# Patient Record
Sex: MALE | Race: BLACK OR AFRICAN AMERICAN | URBAN - METROPOLITAN AREA
[De-identification: names, ages, dates, MRNs, and addresses within clinical notes are randomized per-mention and may not be internally consistent; named-entity substitution may affect disease eponyms.]

---

## 2017-03-28 ENCOUNTER — HOSPITAL ENCOUNTER (EMERGENCY)
Facility: HOSPITAL | Age: 47
Discharge: HOME | End: 2017-03-28
Payer: COMMERCIAL

## 2017-03-28 ENCOUNTER — APPOINTMENT (OUTPATIENT)
Dept: CT IMAGING | Facility: HOSPITAL | Age: 47
End: 2017-03-28
Attending: NURSE PRACTITIONER
Payer: COMMERCIAL

## 2017-03-28 VITALS
DIASTOLIC BLOOD PRESSURE: 78 MMHG | RESPIRATION RATE: 18 BRPM | SYSTOLIC BLOOD PRESSURE: 126 MMHG | HEART RATE: 68 BPM | OXYGEN SATURATION: 99 % | WEIGHT: 186 LBS | TEMPERATURE: 99 F

## 2017-03-28 DIAGNOSIS — K52.9 COLITIS: ICD-10-CM

## 2017-03-28 DIAGNOSIS — R10.32 ABDOMINAL PAIN, LEFT LOWER QUADRANT: Primary | ICD-10-CM

## 2017-03-28 DIAGNOSIS — K57.30 DIVERTICULOSIS OF LARGE INTESTINE WITHOUT HEMORRHAGE: ICD-10-CM

## 2017-03-28 LAB
ANION GAP SERPL CALC-SCNC: 8 MMOL/L (ref 0–18)
BASOPHILS # BLD: 0.1 K/UL (ref 0–0.2)
BASOPHILS NFR BLD: 1 %
BILIRUB UR QL: NEGATIVE
BUN SERPL-MCNC: 11 MG/DL (ref 8–20)
BUN/CREAT SERPL: 10.5 (ref 10–20)
CALCIUM SERPL-MCNC: 9.3 MG/DL (ref 8.5–10.5)
CHLORIDE SERPL-SCNC: 103 MMOL/L (ref 95–110)
CLARITY UR: CLEAR
CO2 SERPL-SCNC: 26 MMOL/L (ref 22–32)
COLOR UR: YELLOW
CREAT SERPL-MCNC: 1.05 MG/DL (ref 0.5–1.5)
EOSINOPHIL # BLD: 0.1 K/UL (ref 0–0.7)
EOSINOPHIL NFR BLD: 1 %
ERYTHROCYTE [DISTWIDTH] IN BLOOD BY AUTOMATED COUNT: 14.5 % (ref 11–15)
GLUCOSE SERPL-MCNC: 247 MG/DL (ref 70–99)
GLUCOSE UR-MCNC: 250 MG/DL
HCT VFR BLD AUTO: 41.9 % (ref 41–52)
HGB BLD-MCNC: 14 G/DL (ref 13.5–17.5)
HGB UR QL STRIP.AUTO: NEGATIVE
KETONES UR-MCNC: NEGATIVE MG/DL
LEUKOCYTE ESTERASE UR QL STRIP.AUTO: NEGATIVE
LYMPHOCYTES # BLD: 2.9 K/UL (ref 1–4)
LYMPHOCYTES NFR BLD: 48 %
MCH RBC QN AUTO: 26.8 PG (ref 27–32)
MCHC RBC AUTO-ENTMCNC: 33.4 G/DL (ref 32–37)
MCV RBC AUTO: 80.4 FL (ref 80–100)
MONOCYTES # BLD: 0.3 K/UL (ref 0–1)
MONOCYTES NFR BLD: 4 %
NEUTROPHILS # BLD AUTO: 2.8 K/UL (ref 1.8–7.7)
NEUTROPHILS NFR BLD: 46 %
NITRITE UR QL STRIP.AUTO: NEGATIVE
OSMOLALITY UR CALC.SUM OF ELEC: 292 MOSM/KG (ref 275–295)
PH UR: 6 [PH] (ref 5–8)
PLATELET # BLD AUTO: 207 K/UL (ref 140–400)
PMV BLD AUTO: 9.4 FL (ref 7.4–10.3)
POTASSIUM SERPL-SCNC: 4 MMOL/L (ref 3.3–5.1)
PROT UR-MCNC: NEGATIVE MG/DL
RBC # BLD AUTO: 5.21 M/UL (ref 4.5–5.9)
RBC #/AREA URNS AUTO: 1 /HPF
SODIUM SERPL-SCNC: 137 MMOL/L (ref 136–144)
SP GR UR STRIP: 1.02 (ref 1–1.03)
UROBILINOGEN UR STRIP-ACNC: <2
WBC # BLD AUTO: 6.2 K/UL (ref 4–11)
WBC #/AREA URNS AUTO: 1 /HPF

## 2017-03-28 PROCEDURE — 36415 COLL VENOUS BLD VENIPUNCTURE: CPT

## 2017-03-28 PROCEDURE — 85025 COMPLETE CBC W/AUTO DIFF WBC: CPT | Performed by: NURSE PRACTITIONER

## 2017-03-28 PROCEDURE — 99284 EMERGENCY DEPT VISIT MOD MDM: CPT

## 2017-03-28 PROCEDURE — 81001 URINALYSIS AUTO W/SCOPE: CPT

## 2017-03-28 PROCEDURE — 74177 CT ABD & PELVIS W/CONTRAST: CPT

## 2017-03-28 PROCEDURE — 80048 BASIC METABOLIC PNL TOTAL CA: CPT | Performed by: NURSE PRACTITIONER

## 2017-03-28 RX ORDER — AMLODIPINE BESYLATE 10 MG/1
10 TABLET ORAL DAILY
COMMUNITY
End: 2017-05-23

## 2017-03-28 RX ORDER — METRONIDAZOLE 500 MG/1
500 TABLET ORAL 3 TIMES DAILY
Qty: 30 TABLET | Refills: 0 | Status: SHIPPED | OUTPATIENT
Start: 2017-03-28 | End: 2017-03-28

## 2017-03-28 RX ORDER — TRAMADOL HYDROCHLORIDE 50 MG/1
TABLET ORAL EVERY 4 HOURS PRN
Qty: 20 TABLET | Refills: 0 | Status: SHIPPED | OUTPATIENT
Start: 2017-03-28 | End: 2017-03-28

## 2017-03-28 RX ORDER — OMEPRAZOLE 20 MG/1
20 CAPSULE, DELAYED RELEASE ORAL
COMMUNITY
End: 2017-05-23

## 2017-03-28 RX ORDER — ACETAMINOPHEN AND CODEINE PHOSPHATE 300; 30 MG/1; MG/1
1 TABLET ORAL EVERY 6 HOURS PRN
COMMUNITY
End: 2017-05-23

## 2017-03-28 RX ORDER — METRONIDAZOLE 500 MG/1
500 TABLET ORAL 3 TIMES DAILY
Qty: 30 TABLET | Refills: 0 | Status: SHIPPED | OUTPATIENT
Start: 2017-03-28 | End: 2017-04-11

## 2017-03-28 RX ORDER — GLIMEPIRIDE 1 MG/1
1 TABLET ORAL 2 TIMES DAILY
COMMUNITY
End: 2017-05-19

## 2017-03-28 RX ORDER — LISINOPRIL 10 MG/1
10 TABLET ORAL DAILY
COMMUNITY
End: 2017-05-23

## 2017-03-28 RX ORDER — SIMVASTATIN 5 MG
10 TABLET ORAL NIGHTLY
COMMUNITY
End: 2017-05-19 | Stop reason: ALTCHOICE

## 2017-03-28 RX ORDER — TRAMADOL HYDROCHLORIDE 50 MG/1
TABLET ORAL EVERY 4 HOURS PRN
Qty: 20 TABLET | Refills: 0 | Status: SHIPPED | OUTPATIENT
Start: 2017-03-28 | End: 2017-04-04

## 2017-03-28 RX ORDER — LEVOFLOXACIN 500 MG/1
500 TABLET, FILM COATED ORAL DAILY
Qty: 10 TABLET | Refills: 0 | Status: SHIPPED | OUTPATIENT
Start: 2017-03-28 | End: 2017-04-11

## 2017-03-28 NOTE — ED PROVIDER NOTES
Patient Seen in: [facility] Emergency Department    History   Patient presents with:  Abdomen/Flank Pain (GI/)    Stated Complaint: c/o abd pain x 1 wk, denies N/V/D    HPI    49-year-old male, with a history of diabetes and hypertension, alda systems reviewed and negative except as noted above. PSFH elements reviewed from today and agreed except as otherwise stated in HPI.     Physical Exam       ED Triage Vitals   BP 03/28/17 1612 173/109 mmHg   Pulse 03/28/17 1612 72   Resp 03/28/17 1612 16 ------                     CBC W/ DIFFERENTIAL[[id number]]          Abnormal            Final result                 Please view results for these tests on the individual orders.    39 Cheri Bustillo GOLD   RAINBOW DRAW Car Krishnamurthy DR tablet Refills: 0    TraMADol HCl 50 MG Oral Tab  Take 1-2 tablets ( mg total) by mouth every 4 (four) hours as needed for Pain. Qty: 20 tablet Refills: 0    metRONIDAZOLE 500 MG Oral Tab  Take 1 tablet (500 mg total) by mouth 3 (three) times daily.

## 2017-03-28 NOTE — ED NOTES
Patient c/o of intermittent sharp LLQ pain for x1 week. He denies any N/V/D. He had a normal BM yesterday. Abdominal area is non tender to palpation. He Denies any GI/ changes or complaints. No dizziness, chest pain, SOB.  Bowel sounds active x4 quadrants

## 2017-03-28 NOTE — ED NOTES
Medications updated in chart. Patient states he has not been taking his prescribed medicine because he feels it causes him to have unfavorable side effects such as abdominal pain.  When asked about his BP- He informed me that he did not take his blood press

## 2017-03-29 ENCOUNTER — TELEPHONE (OUTPATIENT)
Dept: GASTROENTEROLOGY | Facility: CLINIC | Age: 47
End: 2017-03-29

## 2017-03-29 ENCOUNTER — OFFICE VISIT (OUTPATIENT)
Dept: INTERNAL MEDICINE CLINIC | Facility: CLINIC | Age: 47
End: 2017-03-29

## 2017-03-29 VITALS
HEART RATE: 96 BPM | HEIGHT: 67 IN | BODY MASS INDEX: 30.61 KG/M2 | DIASTOLIC BLOOD PRESSURE: 88 MMHG | RESPIRATION RATE: 18 BRPM | WEIGHT: 195 LBS | SYSTOLIC BLOOD PRESSURE: 142 MMHG | TEMPERATURE: 99 F

## 2017-03-29 DIAGNOSIS — R10.32 ABDOMINAL PAIN, LEFT LOWER QUADRANT: Primary | ICD-10-CM

## 2017-03-29 PROBLEM — E78.5 DYSLIPIDEMIA ASSOCIATED WITH TYPE 2 DIABETES MELLITUS (HCC): Status: ACTIVE | Noted: 2017-03-29

## 2017-03-29 PROBLEM — E11.69 DYSLIPIDEMIA ASSOCIATED WITH TYPE 2 DIABETES MELLITUS (HCC): Status: ACTIVE | Noted: 2017-03-29

## 2017-03-29 PROBLEM — E11.9 TYPE 2 DIABETES MELLITUS (HCC): Status: ACTIVE | Noted: 2017-03-29

## 2017-03-29 PROBLEM — I10 ESSENTIAL HYPERTENSION: Status: ACTIVE | Noted: 2017-03-29

## 2017-03-29 PROBLEM — E11.69 DYSLIPIDEMIA ASSOCIATED WITH TYPE 2 DIABETES MELLITUS: Status: ACTIVE | Noted: 2017-03-29

## 2017-03-29 PROBLEM — K57.90 DIVERTICULOSIS: Status: ACTIVE | Noted: 2017-03-29

## 2017-03-29 PROBLEM — E78.5 DYSLIPIDEMIA ASSOCIATED WITH TYPE 2 DIABETES MELLITUS: Status: ACTIVE | Noted: 2017-03-29

## 2017-03-29 PROCEDURE — 99203 OFFICE O/P NEW LOW 30 MIN: CPT | Performed by: INTERNAL MEDICINE

## 2017-03-29 PROCEDURE — 99212 OFFICE O/P EST SF 10 MIN: CPT | Performed by: INTERNAL MEDICINE

## 2017-03-29 RX ORDER — INFLUENZA VIRUS VACCINE 15; 15; 15; 15 UG/.5ML; UG/.5ML; UG/.5ML; UG/.5ML
SUSPENSION INTRAMUSCULAR
Refills: 0 | COMMUNITY
Start: 2017-02-26 | End: 2017-03-29 | Stop reason: ALTCHOICE

## 2017-03-29 RX ORDER — CHLORAL HYDRATE 500 MG
1000 CAPSULE ORAL DAILY
COMMUNITY
End: 2020-08-31 | Stop reason: ALTCHOICE

## 2017-03-29 NOTE — TELEPHONE ENCOUNTER
Dr. Ellen Staley - Pt accepted appt for tomorrow 3.30.17 at 3:00pm at [facility] [place]. Pt confirmed date/time/location. Added to schedule.

## 2017-03-29 NOTE — TELEPHONE ENCOUNTER
Is there availability for patient to be seen tomorrow afternoon at 3pm? It is not my usual clinic time.

## 2017-03-29 NOTE — PATIENT INSTRUCTIONS
Please continue your antibiotics. Schedule a visit with GI as planned. Resume your diabetes, high blood pressure and cholesterol medications. Obtain previous medical records. Return visit in 2-3 weeks.

## 2017-03-29 NOTE — ED NOTES
Discharge instructions reviewed with patient and family. Patient verbalizes understanding. Prescriptions provided as ordered with education. Patient denies any further questions/concerns. IV discontinued. Vitals stable.  Patient d/c from ED in good, stable

## 2017-03-29 NOTE — PROGRESS NOTES
Adri Barron is a 55year old male. Patient presents with:  ER F/U: PATIENT SEEN IN EM-ER YESTERDAY WITH C/O ABDOMINAL PAIN, DX DIVERTICULOSIS.    HPI:   Mr. Sheryl Laguna presents this morning for his initial visit for ER follow-up.     For the past 1 currently is only on amlodipine in addition to antibiotics and tramadol. No medication allergies. Current Outpatient Prescriptions:  omega-3 fatty acids 1000 MG Oral Cap Take 1,000 mg by mouth daily.  Disp:  Rfl:    AmLODIPine Besylate 10 MG Oral Tab Ta regular S1 S2 normal without murmur  ABDOMEN: Small reducible asymptomatic umbilical hernia. Not distended. Bowel sounds normal soft nontender without guarding or rebound and without mass or hepatosplenomegaly      ASSESSMENT AND PLAN:   1.  Abdominal gonzález

## 2017-03-29 NOTE — TELEPHONE ENCOUNTER
Dr. Jacque Ovalles (office on-call for Dr. Candance Berkeley) - Per Dr. Rigo Pineda, the on-call days have not been switched yet. Pls see below. There are no current ER follow up openings. Pls see CT results:  CONCLUSION:       1.  No bowel obstruction or localized inflammatory proce

## 2017-03-29 NOTE — TELEPHONE ENCOUNTER
Patient was in ER for diverticulosis and he was advised to see Dr Sofía Pierre in 3 days for a follow up    advise

## 2017-03-30 ENCOUNTER — TELEPHONE (OUTPATIENT)
Dept: GASTROENTEROLOGY | Facility: CLINIC | Age: 47
End: 2017-03-30

## 2017-03-30 ENCOUNTER — OFFICE VISIT (OUTPATIENT)
Dept: GASTROENTEROLOGY | Facility: CLINIC | Age: 47
End: 2017-03-30

## 2017-03-30 VITALS
HEIGHT: 67.25 IN | DIASTOLIC BLOOD PRESSURE: 88 MMHG | HEART RATE: 83 BPM | SYSTOLIC BLOOD PRESSURE: 140 MMHG | WEIGHT: 195.81 LBS | BODY MASS INDEX: 30.38 KG/M2

## 2017-03-30 DIAGNOSIS — K57.30 DIVERTICULOSIS OF LARGE INTESTINE WITHOUT HEMORRHAGE: Primary | ICD-10-CM

## 2017-03-30 DIAGNOSIS — Z12.11 SCREEN FOR COLON CANCER: ICD-10-CM

## 2017-03-30 PROCEDURE — 99204 OFFICE O/P NEW MOD 45 MIN: CPT | Performed by: INTERNAL MEDICINE

## 2017-03-30 PROCEDURE — 99212 OFFICE O/P EST SF 10 MIN: CPT | Performed by: INTERNAL MEDICINE

## 2017-03-30 RX ORDER — POLYETHYLENE GLYCOL 3350, SODIUM CHLORIDE, SODIUM BICARBONATE, POTASSIUM CHLORIDE 420; 11.2; 5.72; 1.48 G/4L; G/4L; G/4L; G/4L
POWDER, FOR SOLUTION ORAL
Qty: 1 BOTTLE | Refills: 0 | Status: ON HOLD | OUTPATIENT
Start: 2017-03-30 | End: 2017-05-16

## 2017-03-30 NOTE — TELEPHONE ENCOUNTER
Scheduled for:  Colonoscopy [address]  Provider Name:DR. NICHOLSON  Date: 5/16/17   Location: [facility]   Sedation:  IV SEDATION  Time:  0845 AM [facility] 0745 AM  Prep: COLYTE SPLIT DOSE   Meds/Allergies Reconciled?:  NKDA  Diagnosis with codes:  COLON CANCER SCREENING Z

## 2017-03-30 NOTE — PATIENT INSTRUCTIONS
1. Schedule colonoscopy with IV sedation     2.  bowel prep from pharmacy    3. Continue all medications for procedure, EXCEPT   -HOLD glimeperide and metformin the day before and day of the colonoscopy    4.  Read all bowel prep instructions careful

## 2017-03-30 NOTE — H&P
[address] [facility] [address] Gastroenterology                                                                                                  Clinic History and Physical     Pa Outpatient Prescriptions:  PEG 3350-KCl-Na Bicarb-NaCl (TRILYTE) 420 g Oral Recon Soln As directed. Disp: 1 Bottle Rfl: 0   levofloxacin 500 MG Oral Tab Take 1 tablet (500 mg total) by mouth daily.  Disp: 10 tablet Rfl: 0   metRONIDAZOLE 500 MG Oral Tab Damien Machuca comfortable and in no acute discomfort  HEENT: the sclera appears anicteric, oropharynx clear, mucus membranes appear moist  CV- regular rate and rhythm, the extremities are warm and well perfused   Lung- Moves air well;  No labored breathing  Abdomen- soft risks of anesthesia and perforation all leading to prolonged hospitalization, surgical intervention, or even death. I also specifically mentioned the miss rate of colonoscopy of 5-10% in the best of all circumstances.  All questions were answered to the pat

## 2017-04-07 ENCOUNTER — TELEPHONE (OUTPATIENT)
Dept: GASTROENTEROLOGY | Facility: CLINIC | Age: 47
End: 2017-04-07

## 2017-04-07 NOTE — TELEPHONE ENCOUNTER
Pt. States that his pain level is better, so he wants to know if he should continue to take his pain medication? He is not sure if he should get a refill?

## 2017-04-07 NOTE — TELEPHONE ENCOUNTER
Pt contacted and he states that upon d/c from the ED on 3/28/17 for LLQ pain he was prescribed metronidazole 500 mg 1 tab daily for 10 days, levofloxacin 500mg 1 tab 3 times daily for 10 days, and tramadol 50mg 1-2 tabs every 4-6 hours as needed b/c his CT

## 2017-04-07 NOTE — TELEPHONE ENCOUNTER
I would not recommend any more abx, he should check in next week to let us know if pain is not resolving.  He can take tyenol 1 pill 3-4x a day

## 2017-05-03 ENCOUNTER — HOSPITAL ENCOUNTER (EMERGENCY)
Facility: HOSPITAL | Age: 47
Discharge: HOME | End: 2017-05-03
Payer: COMMERCIAL

## 2017-05-03 ENCOUNTER — APPOINTMENT (OUTPATIENT)
Dept: GENERAL RADIOLOGY | Facility: HOSPITAL | Age: 47
End: 2017-05-03
Attending: PHYSICIAN ASSISTANT
Payer: COMMERCIAL

## 2017-05-03 VITALS
OXYGEN SATURATION: 99 % | TEMPERATURE: 99 F | SYSTOLIC BLOOD PRESSURE: 142 MMHG | BODY MASS INDEX: 30.53 KG/M2 | WEIGHT: 190 LBS | RESPIRATION RATE: 16 BRPM | HEIGHT: 66 IN | DIASTOLIC BLOOD PRESSURE: 88 MMHG | HEART RATE: 74 BPM

## 2017-05-03 DIAGNOSIS — R10.9 ABDOMINAL PAIN, ACUTE: Primary | ICD-10-CM

## 2017-05-03 DIAGNOSIS — K59.00 CONSTIPATION, UNSPECIFIED CONSTIPATION TYPE: ICD-10-CM

## 2017-05-03 PROCEDURE — 74000 XR ABDOMEN (1 VIEW) (CPT=74000): CPT

## 2017-05-03 PROCEDURE — 81003 URINALYSIS AUTO W/O SCOPE: CPT

## 2017-05-03 PROCEDURE — 36415 COLL VENOUS BLD VENIPUNCTURE: CPT

## 2017-05-03 PROCEDURE — 99283 EMERGENCY DEPT VISIT LOW MDM: CPT

## 2017-05-03 PROCEDURE — 80048 BASIC METABOLIC PNL TOTAL CA: CPT

## 2017-05-03 PROCEDURE — 85025 COMPLETE CBC W/AUTO DIFF WBC: CPT

## 2017-05-03 NOTE — ED PROVIDER NOTES
Patient Seen in: [facility] Emergency Department    History   No chief complaint on file. Stated Complaint: abdominal pain     The history is provided by the patient.    55 yom with pmhx of HTN, DM2, diverticulosis with onset of LUQ pain this mo pain and constipation. Negative for nausea, vomiting, diarrhea and blood in stool. Genitourinary: Negative. Negative for dysuria, urgency, hematuria, flank pain and testicular pain. Musculoskeletal: Negative. Skin: Negative.     Neurological: Terra Cook Negative   Blood Urine Negative Negative   Nitrite Urine Negative Negative   Urobilinogen Urine <2.0 <2.0   Leukocyte Esterase Urine Negative Negative   Ascorbic Acid Urine Negative Negative mg/dL   Microscopic Microscopic not indicated    -RAINBOW DRAW BL Absolute 0.2 0.0-1.0 K/UL   Eosinophil Absolute 0.1 0.0-0.7 K/UL   Basophil Absolute 0.0 0.0-0.2 K/UL       MDM     Labs WNL, XR showing constipation. Patient without fever or blood in stool. He has appt w/ GI 5/15.  Encouraged stool softeners, increase flu

## 2017-05-03 NOTE — ED INITIAL ASSESSMENT (HPI)
Left lower abd pain constant since this afternoon. Denies f/c, n/v/d. Pt dx diverticulitis 2 weeks ago, sent home with meds. States he feels it is more intense now.

## 2017-05-03 NOTE — ED NOTES
Discharge instructions reviewed with patient and family. Patient verbalizes understanding. Patient denies any further questions/concerns. Vitals stable. Patient d/c from ED in good, stable condition. Family to escort and assist patient home.

## 2017-05-06 ENCOUNTER — OFFICE VISIT (OUTPATIENT)
Dept: INTERNAL MEDICINE CLINIC | Facility: CLINIC | Age: 47
End: 2017-05-06

## 2017-05-06 VITALS
BODY MASS INDEX: 30.29 KG/M2 | HEART RATE: 84 BPM | WEIGHT: 193 LBS | SYSTOLIC BLOOD PRESSURE: 132 MMHG | TEMPERATURE: 98 F | HEIGHT: 67 IN | RESPIRATION RATE: 18 BRPM | DIASTOLIC BLOOD PRESSURE: 84 MMHG

## 2017-05-06 DIAGNOSIS — E11.69 DYSLIPIDEMIA ASSOCIATED WITH TYPE 2 DIABETES MELLITUS (HCC): ICD-10-CM

## 2017-05-06 DIAGNOSIS — I10 ESSENTIAL HYPERTENSION: ICD-10-CM

## 2017-05-06 DIAGNOSIS — E11.9 TYPE 2 DIABETES MELLITUS WITHOUT COMPLICATION, WITHOUT LONG-TERM CURRENT USE OF INSULIN (HCC): ICD-10-CM

## 2017-05-06 DIAGNOSIS — E78.5 DYSLIPIDEMIA ASSOCIATED WITH TYPE 2 DIABETES MELLITUS (HCC): ICD-10-CM

## 2017-05-06 DIAGNOSIS — R10.32 LEFT LOWER QUADRANT ABDOMINAL PAIN OF UNKNOWN ETIOLOGY: Primary | ICD-10-CM

## 2017-05-06 PROCEDURE — 99214 OFFICE O/P EST MOD 30 MIN: CPT | Performed by: INTERNAL MEDICINE

## 2017-05-06 PROCEDURE — 99212 OFFICE O/P EST SF 10 MIN: CPT | Performed by: INTERNAL MEDICINE

## 2017-05-06 RX ORDER — DICYCLOMINE HYDROCHLORIDE 10 MG/1
10 CAPSULE ORAL 3 TIMES DAILY PRN
Qty: 20 CAPSULE | Refills: 0 | Status: SHIPPED | OUTPATIENT
Start: 2017-05-06 | End: 2017-05-23

## 2017-05-06 NOTE — PATIENT INSTRUCTIONS
Await upcoming colonoscopy. Please try dicyclomine 10 mg 3 times daily as needed. Please continue your current medications. Please obtain previous medical records. Obtain blood tests soon when you can. Return visit in one month.

## 2017-05-06 NOTE — PROGRESS NOTES
Eric Garcia is a 55year old male. Patient presents with:  Constipation: PATIENT HERE WITH C/O ABDOMINAL DISCOMFORT AND CONSTIPATION. HPI:   Mr. Barrett Listen presents this morning for ER follow-up.     Since his initial visit with me in March, he lombardo mouth 2 (two) times daily. Disp:  Rfl:    lisinopril 10 MG Oral Tab Take 10 mg by mouth daily. Disp:  Rfl:    Acetaminophen-Codeine 300-30 MG Oral Tab Take 1 tablet by mouth every 6 (six) hours as needed for Pain.  Disp:  Rfl:    simvastatin 5 MG Oral Tab T he obtain previous medical records. Continue current medications for now. Check glycohemoglobin and lipid profile when able. Order sent. - Hemoglobin A1C; Future    3. Essential hypertension  Well-controlled    4.  Dyslipidemia associated with type 2 di

## 2017-05-09 ENCOUNTER — APPOINTMENT (OUTPATIENT)
Dept: LAB | Facility: HOSPITAL | Age: 47
End: 2017-05-09
Attending: INTERNAL MEDICINE
Payer: COMMERCIAL

## 2017-05-09 DIAGNOSIS — E11.9 TYPE 2 DIABETES MELLITUS WITHOUT COMPLICATION, WITHOUT LONG-TERM CURRENT USE OF INSULIN (HCC): ICD-10-CM

## 2017-05-09 DIAGNOSIS — E11.69 DYSLIPIDEMIA ASSOCIATED WITH TYPE 2 DIABETES MELLITUS (HCC): ICD-10-CM

## 2017-05-09 DIAGNOSIS — E78.5 DYSLIPIDEMIA ASSOCIATED WITH TYPE 2 DIABETES MELLITUS (HCC): ICD-10-CM

## 2017-05-09 PROCEDURE — 36415 COLL VENOUS BLD VENIPUNCTURE: CPT

## 2017-05-09 PROCEDURE — 83036 HEMOGLOBIN GLYCOSYLATED A1C: CPT

## 2017-05-09 PROCEDURE — 80061 LIPID PANEL: CPT

## 2017-05-15 ENCOUNTER — TELEPHONE (OUTPATIENT)
Dept: GASTROENTEROLOGY | Facility: CLINIC | Age: 47
End: 2017-05-15

## 2017-05-15 NOTE — TELEPHONE ENCOUNTER
Pt contacted and he states he has questions and concerns regarding the following and would like to speak with the physician:    1.  The risks and the benefits of the procedure, the complication rate of the MD.  2. Wants to know the sterilization process for

## 2017-05-15 NOTE — TELEPHONE ENCOUNTER
Spoke with Sugey Augustin and discussed with him my complication rate (no perforations, one elderly patient on blood thinner with transient bleeding). He is appreciative that I called him back. He will proceed with c-scope.

## 2017-05-16 ENCOUNTER — SURGERY (OUTPATIENT)
Age: 47
End: 2017-05-16

## 2017-05-16 ENCOUNTER — HOSPITAL ENCOUNTER (OUTPATIENT)
Facility: HOSPITAL | Age: 47
Setting detail: HOSPITAL OUTPATIENT SURGERY
Discharge: HOME | End: 2017-05-16
Attending: INTERNAL MEDICINE | Admitting: INTERNAL MEDICINE
Payer: COMMERCIAL

## 2017-05-16 PROCEDURE — 99153 MOD SED SAME PHYS/QHP EA: CPT | Performed by: INTERNAL MEDICINE

## 2017-05-16 PROCEDURE — 45385 COLONOSCOPY W/LESION REMOVAL: CPT | Performed by: INTERNAL MEDICINE

## 2017-05-16 PROCEDURE — 99152 MOD SED SAME PHYS/QHP 5/>YRS: CPT | Performed by: INTERNAL MEDICINE

## 2017-05-16 PROCEDURE — 0DBK8ZX EXCISION OF ASCENDING COLON, VIA NATURAL OR ARTIFICIAL OPENING ENDOSCOPIC, DIAGNOSTIC: ICD-10-PCS | Performed by: INTERNAL MEDICINE

## 2017-05-16 PROCEDURE — 0DBM8ZX EXCISION OF DESCENDING COLON, VIA NATURAL OR ARTIFICIAL OPENING ENDOSCOPIC, DIAGNOSTIC: ICD-10-PCS | Performed by: INTERNAL MEDICINE

## 2017-05-16 RX ORDER — SODIUM CHLORIDE 0.9 % (FLUSH) 0.9 %
10 SYRINGE (ML) INJECTION AS NEEDED
Status: DISCONTINUED | OUTPATIENT
Start: 2017-05-16 | End: 2017-05-16

## 2017-05-16 RX ORDER — MIDAZOLAM HYDROCHLORIDE 1 MG/ML
INJECTION INTRAMUSCULAR; INTRAVENOUS
Status: DISCONTINUED | OUTPATIENT
Start: 2017-05-16 | End: 2017-05-16

## 2017-05-16 RX ORDER — SODIUM CHLORIDE, SODIUM LACTATE, POTASSIUM CHLORIDE, CALCIUM CHLORIDE 600; 310; 30; 20 MG/100ML; MG/100ML; MG/100ML; MG/100ML
INJECTION, SOLUTION INTRAVENOUS CONTINUOUS
Status: DISCONTINUED | OUTPATIENT
Start: 2017-05-16 | End: 2017-05-16

## 2017-05-16 RX ORDER — MIDAZOLAM HYDROCHLORIDE 1 MG/ML
1 INJECTION INTRAMUSCULAR; INTRAVENOUS EVERY 5 MIN PRN
Status: DISCONTINUED | OUTPATIENT
Start: 2017-05-16 | End: 2017-05-16

## 2017-05-16 NOTE — OPERATIVE REPORT
COLONOSCOPY REPORT    Racquel Hanley     1970 Age 55year old   PCP Bernadette Garcia MD Endoscopist Bridger Barrera MD     Date of procedure: 2017    Procedure: Colonoscopy w/cold snare polypectomy    Pre-operative diagnosis: Marcus Han ascending colon; sessile morphology; cold snare polypectomy and retrieved. C. 6 mm polyp in the descending colon; polypoid morphology; cold snare polypectomy and retrieved.       D. 4 mm polyp in the descending colon; polypoid morphology; cold snare po

## 2017-05-16 NOTE — H&P
History & Physical Examination    Patient Name: Elijah Negro  MRN: [id number]  CSN: [id number]  YOB: 1970    Diagnosis: screening for colon cancer      Prescriptions prior to admission:   AmLODIPine Besylate 10 MG Oral Tab Take 10 mg by not normal, please explain:   ROBERTA [ Desiree Cogan  [ Luzma Tee [ So Catherine [ Lorilee Mcburney [ Evert Pompa [ Philipadene Settle      I have discussed the risks and benefits and alternatives of the procedure with the patient/family.   They understand and agree to pro

## 2017-05-17 VITALS
HEART RATE: 68 BPM | OXYGEN SATURATION: 98 % | SYSTOLIC BLOOD PRESSURE: 126 MMHG | BODY MASS INDEX: 28.79 KG/M2 | DIASTOLIC BLOOD PRESSURE: 83 MMHG | WEIGHT: 190 LBS | RESPIRATION RATE: 15 BRPM | HEIGHT: 68 IN

## 2017-05-17 PROBLEM — K63.5 COLON POLYPS: Status: ACTIVE | Noted: 2017-05-17

## 2017-05-18 ENCOUNTER — TELEPHONE (OUTPATIENT)
Dept: GASTROENTEROLOGY | Facility: CLINIC | Age: 47
End: 2017-05-18

## 2017-05-18 DIAGNOSIS — K76.9 LIVER LESION: Primary | ICD-10-CM

## 2017-05-18 NOTE — TELEPHONE ENCOUNTER
Pt contacted and I provided him the number to central scheduling to schedule his MRI of the liver. He is aware that managed care will call him once it is OK for him to schedule.    I also reviewed Dr. Zaina Ruiz message regarding his CLN results from TE 5/18/1

## 2017-05-18 NOTE — TELEPHONE ENCOUNTER
GI Clinical Staff:   Please call patient and set up non urgent MRI liver for f/u of liver lesion seen on CT. I also sent a letter for his colonoscopy results; all benign polyps, but tubular adenomas (pre-cancerous).  He will need repeat in 3 years (CLN)

## 2017-05-18 NOTE — TELEPHONE ENCOUNTER
GI staff: please place recall for colonoscopy in 3 years     Recall colon in 3 years per.  Colon done  I mailed out colonoscopy results letter to pt    em

## 2017-05-18 NOTE — TELEPHONE ENCOUNTER
Pt retd RN call about scheduling testing. See 5/18/17 closed TE. Please call. OK to leave detailed message.

## 2017-05-19 ENCOUNTER — TELEPHONE (OUTPATIENT)
Dept: INTERNAL MEDICINE CLINIC | Facility: CLINIC | Age: 47
End: 2017-05-19

## 2017-05-19 RX ORDER — SIMVASTATIN 10 MG
10 TABLET ORAL NIGHTLY
Qty: 90 TABLET | Refills: 0 | Status: CANCELLED | OUTPATIENT
Start: 2017-05-19

## 2017-05-19 RX ORDER — ATORVASTATIN CALCIUM 20 MG/1
20 TABLET, FILM COATED ORAL NIGHTLY
Qty: 90 TABLET | Refills: 0 | Status: SHIPPED | OUTPATIENT
Start: 2017-05-19 | End: 2017-09-05

## 2017-05-19 NOTE — TELEPHONE ENCOUNTER
1.  pt called and wanted me to inform you that he stopped taking Glimepiride and had stomach pain has resolved. He no longer has any stomach pain.  Pt stated that he is not on any DM medication and wants to ask you if you can advised on another DM m

## 2017-05-19 NOTE — TELEPHONE ENCOUNTER
He has been intolerant of both metformin and glimepiride. There are multiple other medications available for treatment of diabetes, however all of them are unfortunately much more expensive. I would recommend an appointment to discuss treatment options.

## 2017-05-19 NOTE — TELEPHONE ENCOUNTER
Pt stts he stopped taking glimepiride due to stomach pain. Pt stts once he stopped medication his pain went away. Please advise.

## 2017-05-23 ENCOUNTER — OFFICE VISIT (OUTPATIENT)
Dept: INTERNAL MEDICINE CLINIC | Facility: CLINIC | Age: 47
End: 2017-05-23

## 2017-05-23 VITALS
DIASTOLIC BLOOD PRESSURE: 78 MMHG | BODY MASS INDEX: 29.98 KG/M2 | HEIGHT: 67 IN | RESPIRATION RATE: 16 BRPM | HEART RATE: 73 BPM | SYSTOLIC BLOOD PRESSURE: 134 MMHG | WEIGHT: 191 LBS

## 2017-05-23 DIAGNOSIS — E78.5 DYSLIPIDEMIA ASSOCIATED WITH TYPE 2 DIABETES MELLITUS (HCC): ICD-10-CM

## 2017-05-23 DIAGNOSIS — E11.9 TYPE 2 DIABETES MELLITUS WITHOUT COMPLICATION, WITHOUT LONG-TERM CURRENT USE OF INSULIN (HCC): Primary | ICD-10-CM

## 2017-05-23 DIAGNOSIS — E11.69 DYSLIPIDEMIA ASSOCIATED WITH TYPE 2 DIABETES MELLITUS (HCC): ICD-10-CM

## 2017-05-23 DIAGNOSIS — I10 ESSENTIAL HYPERTENSION: ICD-10-CM

## 2017-05-23 PROCEDURE — 99213 OFFICE O/P EST LOW 20 MIN: CPT | Performed by: INTERNAL MEDICINE

## 2017-05-23 PROCEDURE — 99212 OFFICE O/P EST SF 10 MIN: CPT | Performed by: INTERNAL MEDICINE

## 2017-05-23 RX ORDER — AMLODIPINE BESYLATE 10 MG/1
10 TABLET ORAL DAILY
Qty: 30 TABLET | Refills: 5 | Status: SHIPPED | OUTPATIENT
Start: 2017-05-23 | End: 2017-12-16

## 2017-05-23 NOTE — PATIENT INSTRUCTIONS
Please begin Januvia 100 mg daily. Please continue your other medications. Return visit in 2 months.

## 2017-05-23 NOTE — PROGRESS NOTES
Latonya Ramirez is a 55year old male. Patient presents with:  Diabetes    HPI:   Mr. Eric Thomas presents this afternoon for follow-up. Recent visits for persistent left lower quadrant abdominal pain with essentially normal evaluation.   After his las repeat CLN in 5/2020   • Liver lesion       Social History:    Smoking Status: Never Smoker                      Alcohol Use: No                   EXAM:   GENERAL: Pleasant male appearing well in no distress  /78 mmHg  Pulse 73  Resp 16  Ht 5' 7\" (

## 2017-05-24 ENCOUNTER — TELEPHONE (OUTPATIENT)
Dept: FAMILY MEDICINE CLINIC | Facility: CLINIC | Age: 47
End: 2017-05-24

## 2017-05-24 NOTE — TELEPHONE ENCOUNTER
Pt is calling state that a PA is needed for medication SITagliptin Phosphate 100 MG Oral Tab  Pt state that he has been with medication for a couple of days need this work on ASAP pleae

## 2017-05-26 NOTE — TELEPHONE ENCOUNTER
PA denied. Plan states; unable to approve Januvia tablet 100 mg. The information submitted along with review of pharmacy claims history does not indicate criteria B outlined in plan guideline below has been met at this time. Plan Guideline HIM. PA. 58 (Lili Band

## 2017-05-26 NOTE — TELEPHONE ENCOUNTER
I am confused. He seems to meet all criteria that would enable him to get Januvia. He is older than 18, his glycohemoglobin level is greater than 7, and he is intolerant to metformin. He is also intolerant to glimepiride.   Could the prior authorization

## 2017-05-26 NOTE — TELEPHONE ENCOUNTER
Spoke with patient he is very upset and states he needs medication right away. He is okay with Metformin but needs something else in addition. Since insurance is denying can you prescribe something else. Please advise.

## 2017-05-26 NOTE — TELEPHONE ENCOUNTER
Pt calling to check status on PA- Pt states he spoke with insurance and insurance informed him it was denied because     Office did not provide enough information and that was the reason it was denied, Pt loud on the phone states this is his life involved

## 2017-05-30 NOTE — TELEPHONE ENCOUNTER
Januvia 100 mg tab approved #30/30 days for 12 months effective 5/27/2017; spoke with patient and [address] both notified of the approval. Medication has $0 co-pay.

## 2017-06-10 ENCOUNTER — HOSPITAL ENCOUNTER (OUTPATIENT)
Dept: MRI IMAGING | Facility: HOSPITAL | Age: 47
Discharge: HOME | End: 2017-06-10
Attending: INTERNAL MEDICINE
Payer: COMMERCIAL

## 2017-06-10 DIAGNOSIS — K76.9 LIVER LESION: ICD-10-CM

## 2017-06-10 PROCEDURE — 74183 MRI ABD W/O CNTR FLWD CNTR: CPT | Performed by: INTERNAL MEDICINE

## 2017-06-10 PROCEDURE — A9575 INJ GADOTERATE MEGLUMI 0.1ML: HCPCS | Performed by: INTERNAL MEDICINE

## 2017-06-10 PROCEDURE — 82565 ASSAY OF CREATININE: CPT

## 2017-06-12 ENCOUNTER — TELEPHONE (OUTPATIENT)
Dept: GASTROENTEROLOGY | Facility: CLINIC | Age: 47
End: 2017-06-12

## 2017-06-12 NOTE — TELEPHONE ENCOUNTER
D/w Jeremi Welch, MR liver shows simple liver cyst, no complex cyst. Benign, no further evaluation needed for liver cyst. He agrees w/plan.

## 2017-08-18 ENCOUNTER — OFFICE VISIT (OUTPATIENT)
Dept: INTERNAL MEDICINE CLINIC | Facility: CLINIC | Age: 47
End: 2017-08-18

## 2017-08-18 VITALS
WEIGHT: 195 LBS | HEIGHT: 67 IN | HEART RATE: 90 BPM | BODY MASS INDEX: 30.61 KG/M2 | DIASTOLIC BLOOD PRESSURE: 62 MMHG | SYSTOLIC BLOOD PRESSURE: 108 MMHG

## 2017-08-18 DIAGNOSIS — E78.5 DYSLIPIDEMIA ASSOCIATED WITH TYPE 2 DIABETES MELLITUS (HCC): ICD-10-CM

## 2017-08-18 DIAGNOSIS — E11.9 TYPE 2 DIABETES MELLITUS WITHOUT COMPLICATION, WITHOUT LONG-TERM CURRENT USE OF INSULIN (HCC): Primary | ICD-10-CM

## 2017-08-18 DIAGNOSIS — E11.69 DYSLIPIDEMIA ASSOCIATED WITH TYPE 2 DIABETES MELLITUS (HCC): ICD-10-CM

## 2017-08-18 DIAGNOSIS — I10 ESSENTIAL HYPERTENSION: ICD-10-CM

## 2017-08-18 PROBLEM — Z12.11 SCREEN FOR COLON CANCER: Status: RESOLVED | Noted: 2017-03-30 | Resolved: 2017-08-18

## 2017-08-18 PROCEDURE — 99214 OFFICE O/P EST MOD 30 MIN: CPT | Performed by: INTERNAL MEDICINE

## 2017-08-18 PROCEDURE — 99212 OFFICE O/P EST SF 10 MIN: CPT | Performed by: INTERNAL MEDICINE

## 2017-08-18 NOTE — PATIENT INSTRUCTIONS
Please obtain blood and urine testing soon. Please continue your current medications for now. Return visit in 6 months.

## 2017-08-18 NOTE — H&P
Melisa Rust is a 55year old male who presents this morning for follow-up of type 2 diabetes, hypertension and dyslipidemia. HPI:   He feels well today. He is tolerating Januvia without any side effects.   He stopped atorvastatin because it raised fever  LUNGS: No cough wheezing or shortness of breath  CARDIAC: No lightheadedness palpitations or chest pain  GI: No anorexia heartburn dysphagia nausea vomiting abdominal pain diarrhea constipation or rectal bleeding  : No urinary frequency dysuria or mellitus ([facility]  Await labs      Sb Brown MD  8/18/2017  10:30 AM

## 2017-08-23 ENCOUNTER — APPOINTMENT (OUTPATIENT)
Dept: LAB | Facility: HOSPITAL | Age: 47
End: 2017-08-23
Attending: INTERNAL MEDICINE
Payer: COMMERCIAL

## 2017-08-23 DIAGNOSIS — E11.9 TYPE 2 DIABETES MELLITUS WITHOUT COMPLICATION, WITHOUT LONG-TERM CURRENT USE OF INSULIN (HCC): ICD-10-CM

## 2017-08-23 LAB
ALBUMIN SERPL BCP-MCNC: 4.3 G/DL (ref 3.5–4.8)
ALBUMIN/GLOB SERPL: 1.4 {RATIO} (ref 1–2)
ALP SERPL-CCNC: 80 U/L (ref 32–100)
ALT SERPL-CCNC: 27 U/L (ref 17–63)
ANION GAP SERPL CALC-SCNC: 6 MMOL/L (ref 0–18)
AST SERPL-CCNC: 21 U/L (ref 15–41)
BILIRUB SERPL-MCNC: 0.5 MG/DL (ref 0.3–1.2)
BUN SERPL-MCNC: 11 MG/DL (ref 8–20)
BUN/CREAT SERPL: 10.4 (ref 10–20)
CALCIUM SERPL-MCNC: 9.6 MG/DL (ref 8.5–10.5)
CHLORIDE SERPL-SCNC: 106 MMOL/L (ref 95–110)
CHOLEST SERPL-MCNC: 235 MG/DL (ref 110–200)
CO2 SERPL-SCNC: 24 MMOL/L (ref 22–32)
CREAT SERPL-MCNC: 1.06 MG/DL (ref 0.5–1.5)
CREAT UR-MCNC: 54 MG/DL
ERYTHROCYTE [DISTWIDTH] IN BLOOD BY AUTOMATED COUNT: 15.2 % (ref 11–15)
GLOBULIN PLAS-MCNC: 3 G/DL (ref 2.5–3.7)
GLUCOSE SERPL-MCNC: 164 MG/DL (ref 70–99)
HBA1C MFR BLD: 8.3 % (ref 4–6)
HCT VFR BLD AUTO: 46.7 % (ref 41–52)
HDLC SERPL-MCNC: 33 MG/DL
HGB BLD-MCNC: 15.2 G/DL (ref 13.5–17.5)
LDLC SERPL CALC-MCNC: 155 MG/DL (ref 0–99)
MCH RBC QN AUTO: 26.1 PG (ref 27–32)
MCHC RBC AUTO-ENTMCNC: 32.6 G/DL (ref 32–37)
MCV RBC AUTO: 79.9 FL (ref 80–100)
MICROALBUMIN UR-MCNC: 0.5 MG/DL (ref 0–1.8)
MICROALBUMIN/CREAT UR: 9.3 MG/G{CREAT} (ref 0–20)
NONHDLC SERPL-MCNC: 202 MG/DL
OSMOLALITY UR CALC.SUM OF ELEC: 285 MOSM/KG (ref 275–295)
PLATELET # BLD AUTO: 241 K/UL (ref 140–400)
PMV BLD AUTO: 9.6 FL (ref 7.4–10.3)
POTASSIUM SERPL-SCNC: 3.9 MMOL/L (ref 3.3–5.1)
PROT SERPL-MCNC: 7.3 G/DL (ref 5.9–8.4)
RBC # BLD AUTO: 5.85 M/UL (ref 4.5–5.9)
SODIUM SERPL-SCNC: 136 MMOL/L (ref 136–144)
TRIGL SERPL-MCNC: 235 MG/DL (ref 1–149)
WBC # BLD AUTO: 6.9 K/UL (ref 4–11)

## 2017-08-23 PROCEDURE — 80053 COMPREHEN METABOLIC PANEL: CPT

## 2017-08-23 PROCEDURE — 85027 COMPLETE CBC AUTOMATED: CPT

## 2017-08-23 PROCEDURE — 82570 ASSAY OF URINE CREATININE: CPT

## 2017-08-23 PROCEDURE — 83036 HEMOGLOBIN GLYCOSYLATED A1C: CPT

## 2017-08-23 PROCEDURE — 36415 COLL VENOUS BLD VENIPUNCTURE: CPT

## 2017-08-23 PROCEDURE — 82043 UR ALBUMIN QUANTITATIVE: CPT

## 2017-08-23 PROCEDURE — 80061 LIPID PANEL: CPT

## 2017-09-05 RX ORDER — ATORVASTATIN CALCIUM 20 MG/1
TABLET, FILM COATED ORAL
Qty: 90 TABLET | Refills: 1 | Status: SHIPPED | OUTPATIENT
Start: 2017-09-05 | End: 2018-05-30

## 2017-12-04 RX ORDER — SITAGLIPTIN 100 MG/1
TABLET, FILM COATED ORAL
Qty: 30 TABLET | Refills: 0 | Status: SHIPPED | OUTPATIENT
Start: 2017-12-04 | End: 2017-12-16

## 2017-12-16 ENCOUNTER — APPOINTMENT (OUTPATIENT)
Dept: LAB | Facility: HOSPITAL | Age: 47
End: 2017-12-16
Attending: INTERNAL MEDICINE
Payer: COMMERCIAL

## 2017-12-16 ENCOUNTER — TELEPHONE (OUTPATIENT)
Dept: OTHER | Age: 47
End: 2017-12-16

## 2017-12-16 ENCOUNTER — TELEPHONE (OUTPATIENT)
Dept: INTERNAL MEDICINE CLINIC | Facility: CLINIC | Age: 47
End: 2017-12-16

## 2017-12-16 ENCOUNTER — OFFICE VISIT (OUTPATIENT)
Dept: INTERNAL MEDICINE CLINIC | Facility: CLINIC | Age: 47
End: 2017-12-16

## 2017-12-16 VITALS
BODY MASS INDEX: 30.92 KG/M2 | SYSTOLIC BLOOD PRESSURE: 124 MMHG | HEART RATE: 83 BPM | HEIGHT: 67 IN | DIASTOLIC BLOOD PRESSURE: 72 MMHG | WEIGHT: 197 LBS

## 2017-12-16 DIAGNOSIS — I10 ESSENTIAL HYPERTENSION: ICD-10-CM

## 2017-12-16 DIAGNOSIS — E11.9 TYPE 2 DIABETES MELLITUS WITHOUT COMPLICATION, WITHOUT LONG-TERM CURRENT USE OF INSULIN (HCC): Primary | ICD-10-CM

## 2017-12-16 DIAGNOSIS — E11.9 TYPE 2 DIABETES MELLITUS WITHOUT COMPLICATION, WITHOUT LONG-TERM CURRENT USE OF INSULIN (HCC): ICD-10-CM

## 2017-12-16 PROCEDURE — 36415 COLL VENOUS BLD VENIPUNCTURE: CPT

## 2017-12-16 PROCEDURE — 99213 OFFICE O/P EST LOW 20 MIN: CPT | Performed by: INTERNAL MEDICINE

## 2017-12-16 PROCEDURE — 83036 HEMOGLOBIN GLYCOSYLATED A1C: CPT

## 2017-12-16 PROCEDURE — 99212 OFFICE O/P EST SF 10 MIN: CPT | Performed by: INTERNAL MEDICINE

## 2017-12-16 RX ORDER — AMLODIPINE BESYLATE 10 MG/1
10 TABLET ORAL DAILY
Qty: 30 TABLET | Refills: 5 | Status: SHIPPED | OUTPATIENT
Start: 2017-12-16 | End: 2018-05-30

## 2017-12-16 NOTE — PATIENT INSTRUCTIONS
Await results of today's blood test.  Continue current medications. Please resume atorvastatin 20 mg 3 days weekly. Please let me know the name and dose of your diuretic pill. Try to eat healthy, exercise regularly and lose weight.   Return visit in 3 mo

## 2017-12-16 NOTE — PROGRESS NOTES
Agapito Zamudio is a 55year old male. Patient presents with:  Diabetes  Hypertension    HPI:   Mr. Rosendo Franco presents this morning for follow-up of type 2 diabetes, hypertension and dyslipidemia.   At his last visit in August, glycohemoglobin elevated Liver cyst 6/2017    simple liver cyst on MRI liver   • Type 2 diabetes mellitus [facility])      Past Surgical History:  5/16/2017: COLONOSCOPY N/A      Comment: Procedure: COLONOSCOPY;  Surgeon: Davonte Barry MD;  Location: [address] [place] [address] ENDOSCOPY   So

## 2017-12-16 NOTE — TELEPHONE ENCOUNTER
Patient stop back to give RX that he is on Lisinopril 10MG also patient would like a prostate check and sleep Andreas Cancer check, forgot to mention at appointment today.

## 2017-12-16 NOTE — TELEPHONE ENCOUNTER
Pt states that he had an OV today and his [facility] rx was refilled. States that when he went to  his rx, he was charged 500.   In May when pt started this med,  Through PA, he was able to get this med without any cost.  (even though according to May

## 2017-12-18 NOTE — TELEPHONE ENCOUNTER
Please add lisinopril as stated to medication list.  Okay to refill for 6 months if he needs refills. We can discuss prostate check and sleep apnea check at his next visit, unless he wishes to come in sooner.

## 2017-12-18 NOTE — TELEPHONE ENCOUNTER
Pt states he was able to get his Januvia refilled through his insurance provider. Also please see physician to call encounter. Pt has questions about having Lisinopril prescribed and having prostate checked and sleep apnea test ordered.

## 2017-12-19 RX ORDER — LISINOPRIL 10 MG/1
10 TABLET ORAL DAILY
Qty: 30 TABLET | Refills: 5 | Status: SHIPPED | OUTPATIENT
Start: 2017-12-19 | End: 2018-05-30

## 2017-12-19 NOTE — TELEPHONE ENCOUNTER
Contacted pt and informed him that I had updated his medication list to include lisinopril 10 mg once a day. Informed him of [facility] message regarding sleeping issue, and prostate check.   Pt requested an earlier appt instead of waiting for his next visit carin

## 2018-05-30 ENCOUNTER — OFFICE VISIT (OUTPATIENT)
Dept: INTERNAL MEDICINE CLINIC | Facility: CLINIC | Age: 48
End: 2018-05-30

## 2018-05-30 ENCOUNTER — APPOINTMENT (OUTPATIENT)
Dept: LAB | Facility: HOSPITAL | Age: 48
End: 2018-05-30
Attending: INTERNAL MEDICINE
Payer: COMMERCIAL

## 2018-05-30 VITALS
DIASTOLIC BLOOD PRESSURE: 66 MMHG | HEART RATE: 71 BPM | WEIGHT: 190 LBS | BODY MASS INDEX: 29.82 KG/M2 | HEIGHT: 67 IN | SYSTOLIC BLOOD PRESSURE: 122 MMHG

## 2018-05-30 DIAGNOSIS — E78.5 DYSLIPIDEMIA ASSOCIATED WITH TYPE 2 DIABETES MELLITUS (HCC): ICD-10-CM

## 2018-05-30 DIAGNOSIS — E11.9 TYPE 2 DIABETES MELLITUS WITHOUT COMPLICATION, WITHOUT LONG-TERM CURRENT USE OF INSULIN (HCC): Primary | ICD-10-CM

## 2018-05-30 DIAGNOSIS — I10 ESSENTIAL HYPERTENSION: ICD-10-CM

## 2018-05-30 DIAGNOSIS — E11.9 TYPE 2 DIABETES MELLITUS WITHOUT COMPLICATION, WITHOUT LONG-TERM CURRENT USE OF INSULIN (HCC): ICD-10-CM

## 2018-05-30 DIAGNOSIS — E11.69 DYSLIPIDEMIA ASSOCIATED WITH TYPE 2 DIABETES MELLITUS (HCC): ICD-10-CM

## 2018-05-30 PROCEDURE — 36415 COLL VENOUS BLD VENIPUNCTURE: CPT

## 2018-05-30 PROCEDURE — 99213 OFFICE O/P EST LOW 20 MIN: CPT | Performed by: INTERNAL MEDICINE

## 2018-05-30 PROCEDURE — 99212 OFFICE O/P EST SF 10 MIN: CPT | Performed by: INTERNAL MEDICINE

## 2018-05-30 PROCEDURE — 83036 HEMOGLOBIN GLYCOSYLATED A1C: CPT

## 2018-05-30 RX ORDER — LISINOPRIL 10 MG/1
10 TABLET ORAL DAILY
Qty: 90 TABLET | Refills: 1 | Status: SHIPPED | OUTPATIENT
Start: 2018-05-30 | End: 2018-08-29

## 2018-05-30 RX ORDER — AMLODIPINE BESYLATE 10 MG/1
10 TABLET ORAL DAILY
Qty: 90 TABLET | Refills: 1 | Status: SHIPPED | OUTPATIENT
Start: 2018-05-30 | End: 2018-08-29

## 2018-05-30 NOTE — PATIENT INSTRUCTIONS
Await results of glycohemoglobin. Continue current medication. Continue healthy diet and regular exercise. Please schedule a physical in  3 months.

## 2018-05-30 NOTE — PROGRESS NOTES
Diana Siegel is a 52year old male. Patient presents with:  Diabetes  Hypertension    HPI:   Sagrario Valentine presents this morning for follow-up of type 2 diabetes, hypertension and hypercholesterolemia.   Glycohemoglobin at his last visit December 2017 contr Comment: occassionally       EXAM:   GENERAL: Pleasant male appearing well in no distress  /66   Pulse 71   Ht 5' 7\" (1.702 m)   Wt 190 lb (86.2 kg)   BMI 29.76 kg/m²   LUNGS: Resonant to percussion and clear to auscultation  CARDIAC: Rhythm regular

## 2018-08-29 ENCOUNTER — APPOINTMENT (OUTPATIENT)
Dept: LAB | Facility: HOSPITAL | Age: 48
End: 2018-08-29
Attending: INTERNAL MEDICINE
Payer: COMMERCIAL

## 2018-08-29 ENCOUNTER — OFFICE VISIT (OUTPATIENT)
Dept: INTERNAL MEDICINE CLINIC | Facility: CLINIC | Age: 48
End: 2018-08-29
Payer: COMMERCIAL

## 2018-08-29 VITALS
HEART RATE: 62 BPM | BODY MASS INDEX: 29.51 KG/M2 | SYSTOLIC BLOOD PRESSURE: 132 MMHG | WEIGHT: 188 LBS | HEIGHT: 67 IN | DIASTOLIC BLOOD PRESSURE: 84 MMHG

## 2018-08-29 DIAGNOSIS — E11.69 DYSLIPIDEMIA ASSOCIATED WITH TYPE 2 DIABETES MELLITUS (HCC): ICD-10-CM

## 2018-08-29 DIAGNOSIS — E11.9 TYPE 2 DIABETES MELLITUS WITHOUT COMPLICATION, WITHOUT LONG-TERM CURRENT USE OF INSULIN (HCC): ICD-10-CM

## 2018-08-29 DIAGNOSIS — Z00.00 ANNUAL PHYSICAL EXAM: Primary | ICD-10-CM

## 2018-08-29 DIAGNOSIS — E78.5 DYSLIPIDEMIA ASSOCIATED WITH TYPE 2 DIABETES MELLITUS (HCC): ICD-10-CM

## 2018-08-29 DIAGNOSIS — Z00.00 ANNUAL PHYSICAL EXAM: ICD-10-CM

## 2018-08-29 DIAGNOSIS — I10 ESSENTIAL HYPERTENSION: ICD-10-CM

## 2018-08-29 DIAGNOSIS — D12.6 ADENOMATOUS POLYP OF COLON, UNSPECIFIED PART OF COLON: ICD-10-CM

## 2018-08-29 DIAGNOSIS — K57.30 DIVERTICULOSIS OF LARGE INTESTINE WITHOUT HEMORRHAGE: ICD-10-CM

## 2018-08-29 LAB
ALBUMIN SERPL BCP-MCNC: 4.6 G/DL (ref 3.5–4.8)
ALBUMIN/GLOB SERPL: 1.4 {RATIO} (ref 1–2)
ALP SERPL-CCNC: 75 U/L (ref 32–100)
ALT SERPL-CCNC: 26 U/L (ref 17–63)
ANION GAP SERPL CALC-SCNC: 11 MMOL/L (ref 0–18)
AST SERPL-CCNC: 24 U/L (ref 15–41)
BILIRUB SERPL-MCNC: 0.8 MG/DL (ref 0.3–1.2)
BUN SERPL-MCNC: 11 MG/DL (ref 8–20)
BUN/CREAT SERPL: 9.6 (ref 10–20)
CALCIUM SERPL-MCNC: 9.8 MG/DL (ref 8.5–10.5)
CHLORIDE SERPL-SCNC: 103 MMOL/L (ref 95–110)
CHOLEST SERPL-MCNC: 283 MG/DL (ref 110–200)
CO2 SERPL-SCNC: 24 MMOL/L (ref 22–32)
CREAT SERPL-MCNC: 1.14 MG/DL (ref 0.5–1.5)
CREAT UR-MCNC: 195.2 MG/DL
ERYTHROCYTE [DISTWIDTH] IN BLOOD BY AUTOMATED COUNT: 15.4 % (ref 11–15)
GLOBULIN PLAS-MCNC: 3.3 G/DL (ref 2.5–3.7)
GLUCOSE SERPL-MCNC: 154 MG/DL (ref 70–99)
HBA1C MFR BLD: 8.4 % (ref 4–6)
HBV SURFACE AB SER-ACNC: 15.66 MIU/ML (ref ?–10)
HBV SURFACE AG SERPL QL IA: NONREACTIVE
HBV SURFACE AG SERPL QL IA: REACTIVE
HCT VFR BLD AUTO: 48.4 % (ref 41–52)
HCV AB SERPL QL IA: NONREACTIVE
HDLC SERPL-MCNC: 35 MG/DL
HGB BLD-MCNC: 15.9 G/DL (ref 13.5–17.5)
HIV1+2 AB SERPL QL IA: NONREACTIVE
LDLC SERPL CALC-MCNC: 174 MG/DL (ref 0–99)
MCH RBC QN AUTO: 26.8 PG (ref 27–32)
MCHC RBC AUTO-ENTMCNC: 32.9 G/DL (ref 32–37)
MCV RBC AUTO: 81.4 FL (ref 80–100)
MICROALBUMIN UR-MCNC: 1 MG/DL (ref 0–1.8)
MICROALBUMIN/CREAT UR: 5.1 MG/G{CREAT} (ref 0–20)
NONHDLC SERPL-MCNC: 248 MG/DL
OSMOLALITY UR CALC.SUM OF ELEC: 288 MOSM/KG (ref 275–295)
PATIENT FASTING: YES
PLATELET # BLD AUTO: 257 K/UL (ref 140–400)
PMV BLD AUTO: 9.4 FL (ref 7.4–10.3)
POTASSIUM SERPL-SCNC: 4 MMOL/L (ref 3.3–5.1)
PROT SERPL-MCNC: 7.9 G/DL (ref 5.9–8.4)
PSA SERPL-MCNC: 0.4 NG/ML (ref 0–4)
RBC # BLD AUTO: 5.94 M/UL (ref 4.5–5.9)
SODIUM SERPL-SCNC: 138 MMOL/L (ref 136–144)
T PALLIDUM AB SER QL: NEGATIVE
TRIGL SERPL-MCNC: 368 MG/DL (ref 1–149)
WBC # BLD AUTO: 6.5 K/UL (ref 4–11)

## 2018-08-29 PROCEDURE — 80053 COMPREHEN METABOLIC PANEL: CPT

## 2018-08-29 PROCEDURE — 87389 HIV-1 AG W/HIV-1&-2 AB AG IA: CPT

## 2018-08-29 PROCEDURE — 86803 HEPATITIS C AB TEST: CPT

## 2018-08-29 PROCEDURE — 87491 CHLMYD TRACH DNA AMP PROBE: CPT

## 2018-08-29 PROCEDURE — 82570 ASSAY OF URINE CREATININE: CPT

## 2018-08-29 PROCEDURE — 82043 UR ALBUMIN QUANTITATIVE: CPT

## 2018-08-29 PROCEDURE — 87591 N.GONORRHOEAE DNA AMP PROB: CPT

## 2018-08-29 PROCEDURE — 86780 TREPONEMA PALLIDUM: CPT

## 2018-08-29 PROCEDURE — 80061 LIPID PANEL: CPT

## 2018-08-29 PROCEDURE — 99396 PREV VISIT EST AGE 40-64: CPT | Performed by: INTERNAL MEDICINE

## 2018-08-29 PROCEDURE — 83036 HEMOGLOBIN GLYCOSYLATED A1C: CPT

## 2018-08-29 PROCEDURE — 87340 HEPATITIS B SURFACE AG IA: CPT

## 2018-08-29 PROCEDURE — 36415 COLL VENOUS BLD VENIPUNCTURE: CPT

## 2018-08-29 PROCEDURE — 86706 HEP B SURFACE ANTIBODY: CPT

## 2018-08-29 PROCEDURE — 85027 COMPLETE CBC AUTOMATED: CPT

## 2018-08-29 RX ORDER — AMLODIPINE BESYLATE 10 MG/1
10 TABLET ORAL DAILY
Qty: 90 TABLET | Refills: 1 | Status: SHIPPED | OUTPATIENT
Start: 2018-08-29 | End: 2019-08-31

## 2018-08-29 RX ORDER — LISINOPRIL 10 MG/1
10 TABLET ORAL DAILY
Qty: 90 TABLET | Refills: 1 | Status: SHIPPED | OUTPATIENT
Start: 2018-08-29 | End: 2019-08-31

## 2018-08-29 NOTE — H&P
Stanford Lamb is a 52year old male who presents for a complete physical exam, as well as for follow-up of type 2 diabetes, hypertension and dyslipidemia. HPI:   He feels well today.   His wife is seeing a fertility specialist and is being evaluated f scan 3-17 and colonoscopy 5-17   • Dyslipidemia associated with type 2 diabetes mellitus ([address]    • Esophageal reflux    • Essential hypertension    • Hx of adenomatous colonic polyps 05/2017    repeat CLN in 5/2020   • Liver cyst 6/2017    simple liver cy carotid bruits, distal pulses 2+ bilateral dorsalis pedis and posterior tibial  NEURO: Sensory testing with the 10 g monofilament diabetic sensory exam instrument is normal in both feet  GENITAL: Testes normal without mass and without inguinal hernia  RECT hemorrhage  Asymptomatic      Evelina Leon MD  8/29/2018  10:02 AM

## 2018-08-29 NOTE — PATIENT INSTRUCTIONS
Await results of today's blood and urine testing. Continue current medications. Please schedule an appointment with Ophthalmology soon. Continue healthy diet and resume regular exercise. Get a flu shot soon please. Return visit in 6 months.

## 2018-08-30 LAB
C TRACH DNA SPEC QL NAA+PROBE: NEGATIVE
N GONORRHOEA DNA SPEC QL NAA+PROBE: NEGATIVE

## 2018-08-31 ENCOUNTER — TELEPHONE (OUTPATIENT)
Dept: FAMILY MEDICINE CLINIC | Facility: CLINIC | Age: 48
End: 2018-08-31

## 2018-08-31 ENCOUNTER — TELEPHONE (OUTPATIENT)
Dept: INTERNAL MEDICINE CLINIC | Facility: CLINIC | Age: 48
End: 2018-08-31

## 2018-08-31 ENCOUNTER — TELEPHONE (OUTPATIENT)
Dept: OTHER | Age: 48
End: 2018-08-31

## 2018-08-31 RX ORDER — ATORVASTATIN CALCIUM 40 MG/1
40 TABLET, FILM COATED ORAL NIGHTLY
Qty: 90 TABLET | Refills: 1 | Status: SHIPPED | OUTPATIENT
Start: 2018-08-31 | End: 2019-06-19

## 2018-08-31 NOTE — TELEPHONE ENCOUNTER
Spoke to pt, he states that he was previously taking Atorvastatin (per med record, pt was taking Atorvastatin 20mg with refills on 5/19/17 and 9/5/17.)  He states that he had to stop taking it because it increased his blood sugar.  He denies taking steroids

## 2018-08-31 NOTE — TELEPHONE ENCOUNTER
Recent labs reviewed. CMP normal except glucose 154. CBC normal.  Glycohemoglobin elevated at 8.4%. Cholesterol 283 triglycerides 368 HDL 35 . PSA normal at 0.4.   Urine microalbumin normal.  HIV, treponemal antibodies, hepatitis C virus antibody

## 2018-08-31 NOTE — TELEPHONE ENCOUNTER
He has not been on a cholesterol medication. Atorvastatin is in addition to lisinopril, which is a blood pressure medication.

## 2018-08-31 NOTE — TELEPHONE ENCOUNTER
Pt is calling want to know if Dr Graeme James change his Cholesterol  medicine or was the dosage change   Requesting to speak with a RN

## 2018-09-04 NOTE — TELEPHONE ENCOUNTER
Patient called asking if there are any coupons that he could use to decrease the price of the medication. Advised PA nurse will call pharmacy. Spoke with [facility] pharmacist to see if they have any coupons that are available.  Found one coupon and faxe

## 2018-09-05 NOTE — TELEPHONE ENCOUNTER
Instead of Jardiance, recommend Invokana 100 mg 1 pill daily. However we are out of generic options to treat his diabetes, and all medication may be expensive.

## 2018-09-05 NOTE — TELEPHONE ENCOUNTER
Pt requesting an alternative for Jardiance. Verbalized too expensive. Requesting to speak to provider. Also requesting last lab result be sent to Aris Hernandez (wife gyne) fax [phone]. Labs faxed has pt requested.

## 2018-09-05 NOTE — TELEPHONE ENCOUNTER
Patient called stating the cost of the medication is $360 and it is for his deductible for the entire year. Once it is paid patient would not have to pay anything. Patient would like to know if he can have a different medication that would be cheaper.  Blanca

## 2018-09-06 ENCOUNTER — TELEPHONE (OUTPATIENT)
Dept: OTHER | Age: 48
End: 2018-09-06

## 2018-09-06 ENCOUNTER — OFFICE VISIT (OUTPATIENT)
Dept: OPTOMETRY | Facility: CLINIC | Age: 48
End: 2018-09-06
Payer: COMMERCIAL

## 2018-09-06 DIAGNOSIS — E11.9 TYPE 2 DIABETES MELLITUS WITHOUT COMPLICATION, WITHOUT LONG-TERM CURRENT USE OF INSULIN (HCC): Primary | ICD-10-CM

## 2018-09-06 DIAGNOSIS — H52.4 MYOPIA WITH ASTIGMATISM AND PRESBYOPIA, BILATERAL: ICD-10-CM

## 2018-09-06 DIAGNOSIS — H52.203 MYOPIA WITH ASTIGMATISM AND PRESBYOPIA, BILATERAL: ICD-10-CM

## 2018-09-06 DIAGNOSIS — H52.13 MYOPIA WITH ASTIGMATISM AND PRESBYOPIA, BILATERAL: ICD-10-CM

## 2018-09-06 PROCEDURE — 92004 COMPRE OPH EXAM NEW PT 1/>: CPT | Performed by: OPTOMETRIST

## 2018-09-06 PROCEDURE — 92015 DETERMINE REFRACTIVE STATE: CPT | Performed by: OPTOMETRIST

## 2018-09-06 NOTE — TELEPHONE ENCOUNTER
He should take both medications. I have no solution for the cost of the medication.  We are out of generic, low cost options

## 2018-09-06 NOTE — TELEPHONE ENCOUNTER
Spoke with patient, advised Dr Goyal Serum note and verbalized understanding.       Note      He should take both Invokana and Januvia

## 2018-09-06 NOTE — PATIENT INSTRUCTIONS
Myopia with astigmatism and presbyopia, bilateral  New RX given if patient wants to replace his glasses.     Type 2 diabetes mellitus ([facility] [address]  I advised patient that there is no background diabetic retinopathy in either eye and that they should continue to ke

## 2018-09-06 NOTE — PROGRESS NOTES
Tylor Jordan is a 52year old male. HPI:     HPI     Diabetic Eye Exam   Diabetes characteristics include controlled with diet and taking oral medications. Duration of 5 years. Number of years on pills 5. Number of years on insulin 0.   Does PT Phosphate (JANUVIA) 100 MG Oral Tab TAKE 1 TABLET(100 MG) BY MOUTH DAILY Disp: 90 tablet Rfl: 1   omega-3 fatty acids 1000 MG Oral Cap Take 1,000 mg by mouth daily.  Disp:  Rfl:        Allergies:  No Known Allergies    ROS:     ROS     Negative for: Constit -0.75 -1.25 090            Manifest Refraction #2       Sphere Cylinder Axis Dist VA Add    Right -0.75 -0.75 085 20/20 +1.50    Left -0.75 -1.00 090 20/20 +1.50          Final Rx       Sphere Cylinder Axis Add    Right -0.75 -0.75 085 +1.50    Left -0.75

## 2018-09-06 NOTE — TELEPHONE ENCOUNTER
PA approved effective 9/6/2018 for 365 days; [facility] pharmacy notified of the approval. Cost is $360 for 3 months supply. Spoke with patient informed of the deductible that he has to pay.  Patient would like to know if he should be taking the Januvia jonn

## 2018-09-06 NOTE — TELEPHONE ENCOUNTER
Patient states new rx requires a PA. PA for Invokana 100 mg tab completed with EPS via CMM response time 24-72 hours KEY [id number].

## 2018-09-06 NOTE — TELEPHONE ENCOUNTER
Spoke with patient, advised Dr Jana Baker note and verbalized understanding, requesting to send a note to the pharmacy about using any online coupon than can help him ,pharmacy verified.  Medication generated and note sent together regarding online coupon with

## 2019-01-05 RX ORDER — EMPAGLIFLOZIN 10 MG/1
TABLET, FILM COATED ORAL
Qty: 90 TABLET | Refills: 0 | OUTPATIENT
Start: 2019-01-05

## 2019-01-10 RX ORDER — EMPAGLIFLOZIN 10 MG/1
TABLET, FILM COATED ORAL
Qty: 90 TABLET | Refills: 0 | Status: SHIPPED | OUTPATIENT
Start: 2019-01-10 | End: 2019-01-12

## 2019-01-10 NOTE — TELEPHONE ENCOUNTER
Pt scheduled f/u appt on 1/12 and states out of meds below and req a refill until this appt.      •  SITagliptin Phosphate (JANUVIA) 100 MG Oral Tab, TAKE 1 TABLET(100 MG) BY MOUTH DAILY, Disp: 90 tablet, Rfl: 1

## 2019-01-12 ENCOUNTER — OFFICE VISIT (OUTPATIENT)
Dept: INTERNAL MEDICINE CLINIC | Facility: CLINIC | Age: 49
End: 2019-01-12
Payer: COMMERCIAL

## 2019-01-12 ENCOUNTER — MED REC SCAN ONLY (OUTPATIENT)
Dept: INTERNAL MEDICINE CLINIC | Facility: CLINIC | Age: 49
End: 2019-01-12

## 2019-01-12 VITALS
DIASTOLIC BLOOD PRESSURE: 72 MMHG | HEART RATE: 76 BPM | BODY MASS INDEX: 30.45 KG/M2 | SYSTOLIC BLOOD PRESSURE: 124 MMHG | HEIGHT: 67 IN | WEIGHT: 194 LBS

## 2019-01-12 DIAGNOSIS — E78.5 DYSLIPIDEMIA ASSOCIATED WITH TYPE 2 DIABETES MELLITUS (HCC): ICD-10-CM

## 2019-01-12 DIAGNOSIS — E11.9 TYPE 2 DIABETES MELLITUS WITHOUT COMPLICATION, WITHOUT LONG-TERM CURRENT USE OF INSULIN (HCC): Primary | ICD-10-CM

## 2019-01-12 DIAGNOSIS — E11.69 DYSLIPIDEMIA ASSOCIATED WITH TYPE 2 DIABETES MELLITUS (HCC): ICD-10-CM

## 2019-01-12 DIAGNOSIS — I10 ESSENTIAL HYPERTENSION: ICD-10-CM

## 2019-01-12 PROCEDURE — 99213 OFFICE O/P EST LOW 20 MIN: CPT | Performed by: INTERNAL MEDICINE

## 2019-01-12 PROCEDURE — 99212 OFFICE O/P EST SF 10 MIN: CPT | Performed by: INTERNAL MEDICINE

## 2019-01-12 PROCEDURE — 90686 IIV4 VACC NO PRSV 0.5 ML IM: CPT | Performed by: INTERNAL MEDICINE

## 2019-01-12 PROCEDURE — 90471 IMMUNIZATION ADMIN: CPT | Performed by: INTERNAL MEDICINE

## 2019-01-12 NOTE — PROGRESS NOTES
Moe Copeland is a 50year old male. Patient presents with:  Diabetes  Hypertension    HPI:   Patt Lesches presents this morning for follow-up of type 2 diabetes, hypertension and dyslipidemia.   At his physical last August, glycohemoglobin 8.4% and  COLONOSCOPY N/A 5/16/2017    Performed by Tk Samson MD at [address] [place] [address] ENDOSCOPY      Social History:  Social History    Tobacco Use      Smoking status: Never Smoker      Smokeless tobacco: Never Used    Alcohol use:  Yes      Alcohol/week: 0.6 oz      Types

## 2019-01-12 NOTE — PATIENT INSTRUCTIONS
You received a flu shot today. Please obtain blood tests soon when you can. Continue current medication. Please schedule a physical in 6 months.

## 2019-01-14 ENCOUNTER — APPOINTMENT (OUTPATIENT)
Dept: LAB | Facility: HOSPITAL | Age: 49
End: 2019-01-14
Attending: INTERNAL MEDICINE
Payer: COMMERCIAL

## 2019-01-14 DIAGNOSIS — E11.9 TYPE 2 DIABETES MELLITUS WITHOUT COMPLICATION, WITHOUT LONG-TERM CURRENT USE OF INSULIN (HCC): ICD-10-CM

## 2019-01-14 DIAGNOSIS — E78.5 DYSLIPIDEMIA ASSOCIATED WITH TYPE 2 DIABETES MELLITUS (HCC): ICD-10-CM

## 2019-01-14 DIAGNOSIS — E11.69 DYSLIPIDEMIA ASSOCIATED WITH TYPE 2 DIABETES MELLITUS (HCC): ICD-10-CM

## 2019-01-14 LAB
ALT SERPL-CCNC: 30 U/L (ref 17–63)
AST SERPL-CCNC: 21 U/L (ref 15–41)
CHOLEST SERPL-MCNC: 154 MG/DL (ref 110–200)
EST. AVERAGE GLUCOSE BLD GHB EST-MCNC: 151 MG/DL (ref 68–126)
HBA1C MFR BLD HPLC: 6.9 % (ref ?–5.7)
HDLC SERPL-MCNC: 31 MG/DL
LDLC SERPL CALC-MCNC: 88 MG/DL (ref 0–99)
NONHDLC SERPL-MCNC: 123 MG/DL
TRIGL SERPL-MCNC: 177 MG/DL (ref 1–149)

## 2019-01-14 PROCEDURE — 84460 ALANINE AMINO (ALT) (SGPT): CPT

## 2019-01-14 PROCEDURE — 84450 TRANSFERASE (AST) (SGOT): CPT

## 2019-01-14 PROCEDURE — 36415 COLL VENOUS BLD VENIPUNCTURE: CPT

## 2019-01-14 PROCEDURE — 83036 HEMOGLOBIN GLYCOSYLATED A1C: CPT

## 2019-01-14 PROCEDURE — 80061 LIPID PANEL: CPT

## 2019-02-12 ENCOUNTER — TELEPHONE (OUTPATIENT)
Dept: INTERNAL MEDICINE CLINIC | Facility: CLINIC | Age: 49
End: 2019-02-12

## 2019-02-12 NOTE — TELEPHONE ENCOUNTER
Contacted pt and informed him that our section on the form has been completed. I also faxed form and tax return form to number provided and rec'd confirmation.   Pt verbalized understanding

## 2019-02-12 NOTE — TELEPHONE ENCOUNTER
Patient dropped off form for financial assistance for [employer]. Please complete and fax to [phone]. Patient would like to  form when completed.

## 2019-03-08 ENCOUNTER — TELEPHONE (OUTPATIENT)
Dept: INTERNAL MEDICINE CLINIC | Facility: CLINIC | Age: 49
End: 2019-03-08

## 2019-03-08 DIAGNOSIS — E11.9 TYPE 2 DIABETES MELLITUS WITHOUT COMPLICATION, WITHOUT LONG-TERM CURRENT USE OF INSULIN (HCC): ICD-10-CM

## 2019-03-08 NOTE — TELEPHONE ENCOUNTER
[employer] called in stating that they received Pt's application for assistance, but they did not receive a script for medication below. She is requesting that be sent to the office. Please advise.      Fax# [phone]

## 2019-03-08 NOTE — TELEPHONE ENCOUNTER
Pended Jardiance for approval.  Once approved I will fax script over to [address] at [facility]

## 2019-03-29 ENCOUNTER — HOSPITAL ENCOUNTER (OUTPATIENT)
Dept: ULTRASOUND IMAGING | Age: 49
Discharge: HOME | End: 2019-03-29
Attending: INTERNAL MEDICINE

## 2019-03-29 DIAGNOSIS — Z13.6 SCREENING FOR CARDIOVASCULAR CONDITION: ICD-10-CM

## 2019-04-02 ENCOUNTER — HOSPITAL ENCOUNTER (OUTPATIENT)
Dept: CT IMAGING | Age: 49
Discharge: HOME | End: 2019-04-02
Attending: INTERNAL MEDICINE

## 2019-04-02 DIAGNOSIS — Z13.6 SCREENING FOR CARDIOVASCULAR CONDITION: ICD-10-CM

## 2019-04-02 NOTE — PROGRESS NOTES
Pt seen at [facility], [facility] for CTHS:  PRELIMINARY SCORE= 0.0  BP=  Cholestec labs as follows: Patient just had PV screening done last week, with cholesterol and BP.  TC=  HDL=  LDL=  TG=  GLUCOSE=  All results and risk factors discussed with patient; all qu

## 2019-04-04 ENCOUNTER — TELEPHONE (OUTPATIENT)
Dept: INTERNAL MEDICINE CLINIC | Facility: CLINIC | Age: 49
End: 2019-04-04

## 2019-04-04 NOTE — TELEPHONE ENCOUNTER
Advised patient of Dr. Millicent eaton. Patient verbalized understanding and had no further questions.

## 2019-04-04 NOTE — TELEPHONE ENCOUNTER
Please call patient with all the results from his free screenings done recently. He as an appointment with dr. Angel Luis Perkins for Monday 4/8/19 at 12:40pm  to discuss said results however he wants to be called by Dr. Angel Luis Perkins prior to his appointment. Please advise.

## 2019-04-04 NOTE — TELEPHONE ENCOUNTER
Pt states that he had a stroke and heart scan test done last week in [place]. Pt would like to know if the doctor received the results and would like a call back.

## 2019-04-13 ENCOUNTER — OFFICE VISIT (OUTPATIENT)
Dept: INTERNAL MEDICINE CLINIC | Facility: CLINIC | Age: 49
End: 2019-04-13
Payer: COMMERCIAL

## 2019-04-13 VITALS
HEART RATE: 80 BPM | SYSTOLIC BLOOD PRESSURE: 130 MMHG | RESPIRATION RATE: 16 BRPM | DIASTOLIC BLOOD PRESSURE: 82 MMHG | WEIGHT: 191 LBS | BODY MASS INDEX: 29.98 KG/M2 | HEIGHT: 67 IN

## 2019-04-13 DIAGNOSIS — M25.551 RIGHT HIP PAIN: Primary | ICD-10-CM

## 2019-04-13 PROCEDURE — 99213 OFFICE O/P EST LOW 20 MIN: CPT | Performed by: INTERNAL MEDICINE

## 2019-04-13 PROCEDURE — G0463 HOSPITAL OUTPT CLINIC VISIT: HCPCS | Performed by: INTERNAL MEDICINE

## 2019-04-13 NOTE — PROGRESS NOTES
Zoey Sloan is a 50year old male. Patient presents with:  Results    HPI:   Recently he decided to undergo ultrasound screening of his carotid arteries and abdominal aorta, as well as cardiac CT scan for calcium score.   He is here today to [facility] oz      Types: 1 Glasses of wine per week      Comment: Occasional    Drug use: No       EXAM:   GENERAL: Pleasant male appearing well in no distress  /82   Pulse 80   Resp 16   Ht 5' 7\" (1.702 m)   Wt 191 lb (86.6 kg)   BMI 29.91 kg/m²   EXTREMITIE

## 2019-04-13 NOTE — PATIENT INSTRUCTIONS
Please apply heat or ice to your right hip when needed, and take Advil when needed. Keep your appointment in July.

## 2019-05-15 ENCOUNTER — OFFICE VISIT (OUTPATIENT)
Dept: INTERNAL MEDICINE CLINIC | Facility: CLINIC | Age: 49
End: 2019-05-15
Payer: COMMERCIAL

## 2019-05-15 VITALS
HEART RATE: 71 BPM | DIASTOLIC BLOOD PRESSURE: 82 MMHG | HEIGHT: 67 IN | SYSTOLIC BLOOD PRESSURE: 132 MMHG | BODY MASS INDEX: 30.45 KG/M2 | TEMPERATURE: 98 F | WEIGHT: 194 LBS

## 2019-05-15 DIAGNOSIS — B00.1 HERPES LABIALIS: Primary | ICD-10-CM

## 2019-05-15 PROCEDURE — 99212 OFFICE O/P EST SF 10 MIN: CPT | Performed by: INTERNAL MEDICINE

## 2019-05-15 PROCEDURE — 99213 OFFICE O/P EST LOW 20 MIN: CPT | Performed by: INTERNAL MEDICINE

## 2019-05-15 NOTE — PROGRESS NOTES
Betzy Alejo is a 50year old male. Patient presents with:  Abscess (integumentary): Located on his bottom left side of the lip for 3 days.     HPI:   About 3 days ago, he awoke in the morning with fatigue and noticed a lesion on his lateral lef (36.9 °C) (Oral)   Ht 5' 7\" (1.702 m)   Wt 194 lb (88 kg)   BMI 30.38 kg/m²   HEENT: Small approximately 1 cm lesion lateral aspect left lower lip with visible tiny vesicles when viewed under magnification.   Oropharynx normal      ASSESSMENT AND PLAN:   1

## 2019-05-20 NOTE — TELEPHONE ENCOUNTER
Pls advise on message below Performed By: Pito Mohan Urine Pregnancy Test Result: negative Detail Level: None

## 2019-06-19 RX ORDER — ATORVASTATIN CALCIUM 40 MG/1
TABLET, FILM COATED ORAL
Qty: 90 TABLET | Refills: 1 | Status: SHIPPED | OUTPATIENT
Start: 2019-06-19 | End: 2019-08-31

## 2019-08-15 ENCOUNTER — TELEPHONE (OUTPATIENT)
Dept: INTERNAL MEDICINE CLINIC | Facility: CLINIC | Age: 49
End: 2019-08-15

## 2019-08-15 NOTE — TELEPHONE ENCOUNTER
Pt is due for a px after 8/29. Pt is only available on Saturdays. Narendra Manning, can we use a non px block on 8/31/2019 ? Please advise, thank you!        Please reply to pool: KACIE Burnett

## 2019-08-31 ENCOUNTER — APPOINTMENT (OUTPATIENT)
Dept: LAB | Facility: HOSPITAL | Age: 49
End: 2019-08-31
Attending: INTERNAL MEDICINE
Payer: COMMERCIAL

## 2019-08-31 ENCOUNTER — OFFICE VISIT (OUTPATIENT)
Dept: INTERNAL MEDICINE CLINIC | Facility: CLINIC | Age: 49
End: 2019-08-31
Payer: COMMERCIAL

## 2019-08-31 ENCOUNTER — TELEPHONE (OUTPATIENT)
Dept: INTERNAL MEDICINE CLINIC | Facility: CLINIC | Age: 49
End: 2019-08-31

## 2019-08-31 VITALS
DIASTOLIC BLOOD PRESSURE: 82 MMHG | BODY MASS INDEX: 30.03 KG/M2 | SYSTOLIC BLOOD PRESSURE: 132 MMHG | WEIGHT: 191.31 LBS | HEART RATE: 80 BPM | HEIGHT: 67 IN

## 2019-08-31 DIAGNOSIS — D12.6 ADENOMATOUS POLYP OF COLON, UNSPECIFIED PART OF COLON: ICD-10-CM

## 2019-08-31 DIAGNOSIS — E11.9 TYPE 2 DIABETES MELLITUS WITHOUT COMPLICATION, WITHOUT LONG-TERM CURRENT USE OF INSULIN (HCC): ICD-10-CM

## 2019-08-31 DIAGNOSIS — Z00.00 ANNUAL PHYSICAL EXAM: Primary | ICD-10-CM

## 2019-08-31 DIAGNOSIS — I10 ESSENTIAL HYPERTENSION: ICD-10-CM

## 2019-08-31 DIAGNOSIS — Z00.00 ANNUAL PHYSICAL EXAM: ICD-10-CM

## 2019-08-31 DIAGNOSIS — E11.69 DYSLIPIDEMIA ASSOCIATED WITH TYPE 2 DIABETES MELLITUS (HCC): ICD-10-CM

## 2019-08-31 DIAGNOSIS — E78.5 DYSLIPIDEMIA ASSOCIATED WITH TYPE 2 DIABETES MELLITUS (HCC): ICD-10-CM

## 2019-08-31 LAB
ALBUMIN SERPL-MCNC: 4.3 G/DL (ref 3.4–5)
ALBUMIN/GLOB SERPL: 1.2 {RATIO} (ref 1–2)
ALP LIVER SERPL-CCNC: 81 U/L (ref 45–117)
ALT SERPL-CCNC: 28 U/L (ref 16–61)
ANION GAP SERPL CALC-SCNC: 8 MMOL/L (ref 0–18)
AST SERPL-CCNC: 23 U/L (ref 15–37)
BILIRUB SERPL-MCNC: 0.5 MG/DL (ref 0.1–2)
BUN BLD-MCNC: 10 MG/DL (ref 7–18)
BUN/CREAT SERPL: 7.9 (ref 10–20)
CALCIUM BLD-MCNC: 9.4 MG/DL (ref 8.5–10.1)
CHLORIDE SERPL-SCNC: 106 MMOL/L (ref 98–112)
CHOLEST SMN-MCNC: 243 MG/DL (ref ?–200)
CO2 SERPL-SCNC: 24 MMOL/L (ref 21–32)
COMPLEXED PSA SERPL-MCNC: 0.51 NG/ML (ref ?–4)
CREAT BLD-MCNC: 1.27 MG/DL (ref 0.7–1.3)
CREAT UR-SCNC: 218 MG/DL
DEPRECATED RDW RBC AUTO: 44.2 FL (ref 35.1–46.3)
ERYTHROCYTE [DISTWIDTH] IN BLOOD BY AUTOMATED COUNT: 14.6 % (ref 11–15)
EST. AVERAGE GLUCOSE BLD GHB EST-MCNC: 151 MG/DL (ref 68–126)
GLOBULIN PLAS-MCNC: 3.7 G/DL (ref 2.8–4.4)
GLUCOSE BLD-MCNC: 109 MG/DL (ref 70–99)
HBA1C MFR BLD HPLC: 6.9 % (ref ?–5.7)
HCT VFR BLD AUTO: 49 % (ref 39–53)
HDLC SERPL-MCNC: 37 MG/DL (ref 40–59)
HGB BLD-MCNC: 16 G/DL (ref 13–17.5)
LDLC SERPL CALC-MCNC: 159 MG/DL (ref ?–100)
M PROTEIN MFR SERPL ELPH: 8 G/DL (ref 6.4–8.2)
MCH RBC QN AUTO: 27.2 PG (ref 26–34)
MCHC RBC AUTO-ENTMCNC: 32.7 G/DL (ref 31–37)
MCV RBC AUTO: 83.2 FL (ref 80–100)
MICROALBUMIN UR-MCNC: 2.78 MG/DL
MICROALBUMIN/CREAT 24H UR-RTO: 12.8 UG/MG (ref ?–30)
NONHDLC SERPL-MCNC: 206 MG/DL (ref ?–130)
OSMOLALITY SERPL CALC.SUM OF ELEC: 286 MOSM/KG (ref 275–295)
PATIENT FASTING: YES
PATIENT FASTING: YES
PLATELET # BLD AUTO: 263 10(3)UL (ref 150–450)
POTASSIUM SERPL-SCNC: 3.9 MMOL/L (ref 3.5–5.1)
RBC # BLD AUTO: 5.89 X10(6)UL (ref 4.3–5.7)
SODIUM SERPL-SCNC: 138 MMOL/L (ref 136–145)
TRIGL SERPL-MCNC: 233 MG/DL (ref 30–149)
VLDLC SERPL CALC-MCNC: 47 MG/DL (ref 0–30)
WBC # BLD AUTO: 5.4 X10(3) UL (ref 4–11)

## 2019-08-31 PROCEDURE — 85027 COMPLETE CBC AUTOMATED: CPT

## 2019-08-31 PROCEDURE — 36415 COLL VENOUS BLD VENIPUNCTURE: CPT

## 2019-08-31 PROCEDURE — 99396 PREV VISIT EST AGE 40-64: CPT | Performed by: INTERNAL MEDICINE

## 2019-08-31 PROCEDURE — 80053 COMPREHEN METABOLIC PANEL: CPT

## 2019-08-31 PROCEDURE — 82043 UR ALBUMIN QUANTITATIVE: CPT

## 2019-08-31 PROCEDURE — 83036 HEMOGLOBIN GLYCOSYLATED A1C: CPT

## 2019-08-31 PROCEDURE — 82570 ASSAY OF URINE CREATININE: CPT

## 2019-08-31 PROCEDURE — 80061 LIPID PANEL: CPT

## 2019-08-31 RX ORDER — LISINOPRIL 10 MG/1
10 TABLET ORAL DAILY
Qty: 90 TABLET | Refills: 1 | Status: SHIPPED | OUTPATIENT
Start: 2019-08-31 | End: 2020-09-01

## 2019-08-31 RX ORDER — AMLODIPINE BESYLATE 10 MG/1
10 TABLET ORAL DAILY
Qty: 90 TABLET | Refills: 1 | Status: SHIPPED | OUTPATIENT
Start: 2019-08-31 | End: 2020-09-01

## 2019-08-31 RX ORDER — ATORVASTATIN CALCIUM 40 MG/1
TABLET, FILM COATED ORAL
Qty: 90 TABLET | Refills: 1 | Status: SHIPPED | OUTPATIENT
Start: 2019-08-31 | End: 2021-08-01

## 2019-08-31 NOTE — PATIENT INSTRUCTIONS
Await results of today's blood and urine testing. Continue current medications. Continue healthy diet and regular exercise, maintaining your current weight. Please get a flu shot soon. Schedule an appointment with Ophthalmology soon.   Return visit in 6

## 2019-08-31 NOTE — TELEPHONE ENCOUNTER
Please call to initiate prior authorization process. He is intolerant of metformin and other diabetes medication. We had to go through the authorization process for Jardiance previously.

## 2019-08-31 NOTE — TELEPHONE ENCOUNTER
Current Outpatient Medications:  Empagliflozin (JARDIANCE) 10 MG Oral Tab Take 1 tablet by mouth once daily. Disp: 90 tablet Rfl: 1     Plan does not cover this medication.  Please call plan at [phone] to initiate PA or call/fax pharmacy to change me

## 2019-08-31 NOTE — H&P
Srinivas Ramos is a 50year old male who presents for a complete physical exam, as well as for follow-up of type 2 diabetes, hypertension and dyslipidemia. HPI:   His last physical was August 2018. He feels well.   No specific issues for discuss 3-17 and colonoscopy 5-17   • Dyslipidemia associated with type 2 diabetes mellitus ([facility] [address]    • Esophageal reflux    • Essential hypertension    • Hx of adenomatous colonic polyps 05/2017    repeat CLN in 5/2020   • Liver cyst 6/2017    simple liver cyst on bruits, distal pulses 2+ bilateral dorsalis pedis and posterior tibial  NEURO: Sensory testing with the 10 g monofilament diabetic sensory exam instrument is normal in both feet  GENITAL: Testes normal without mass and without inguinal hernia  RECTAL: Pros statin therapy. Await labs    5. Adenomatous polyp of colon, unspecified part of colon  Anticipate next colonoscopy 2020.       Judithe Ormond, MD  8/31/2019  10:47 AM

## 2020-01-23 ENCOUNTER — NURSE TRIAGE (OUTPATIENT)
Dept: INTERNAL MEDICINE CLINIC | Facility: CLINIC | Age: 50
End: 2020-01-23

## 2020-01-23 NOTE — TELEPHONE ENCOUNTER
Action Requested: Summary for Provider     []  Critical Lab, Recommendations Needed  [] Need Additional Advice  []   FYI    []   Need Orders  [] Need Medications Sent to Pharmacy  []  Other     SUMMARY: patient c/o intermittent pain in scrotum, 5/10 onset

## 2020-01-23 NOTE — TELEPHONE ENCOUNTER
Patient called in with symptoms below:    -Testicular pain when working out    CSS transferred to triage.

## 2020-01-24 ENCOUNTER — APPOINTMENT (OUTPATIENT)
Dept: LAB | Facility: HOSPITAL | Age: 50
End: 2020-01-24
Attending: INTERNAL MEDICINE
Payer: COMMERCIAL

## 2020-01-24 ENCOUNTER — OFFICE VISIT (OUTPATIENT)
Dept: INTERNAL MEDICINE CLINIC | Facility: CLINIC | Age: 50
End: 2020-01-24
Payer: COMMERCIAL

## 2020-01-24 ENCOUNTER — OFFICE VISIT (OUTPATIENT)
Dept: OPTOMETRY | Facility: CLINIC | Age: 50
End: 2020-01-24
Payer: COMMERCIAL

## 2020-01-24 ENCOUNTER — TELEPHONE (OUTPATIENT)
Dept: OTHER | Age: 50
End: 2020-01-24

## 2020-01-24 VITALS
WEIGHT: 193 LBS | HEART RATE: 75 BPM | TEMPERATURE: 99 F | BODY MASS INDEX: 29.25 KG/M2 | SYSTOLIC BLOOD PRESSURE: 136 MMHG | DIASTOLIC BLOOD PRESSURE: 74 MMHG | HEIGHT: 68 IN

## 2020-01-24 DIAGNOSIS — E11.9 TYPE 2 DIABETES MELLITUS WITHOUT COMPLICATION, WITHOUT LONG-TERM CURRENT USE OF INSULIN (HCC): ICD-10-CM

## 2020-01-24 DIAGNOSIS — H52.4 MYOPIA WITH ASTIGMATISM AND PRESBYOPIA, BILATERAL: ICD-10-CM

## 2020-01-24 DIAGNOSIS — H52.13 MYOPIA WITH ASTIGMATISM AND PRESBYOPIA, BILATERAL: ICD-10-CM

## 2020-01-24 DIAGNOSIS — N50.812 LEFT TESTICULAR PAIN: Primary | ICD-10-CM

## 2020-01-24 DIAGNOSIS — E11.9 TYPE 2 DIABETES MELLITUS WITHOUT COMPLICATION, WITHOUT LONG-TERM CURRENT USE OF INSULIN (HCC): Primary | ICD-10-CM

## 2020-01-24 DIAGNOSIS — H52.203 MYOPIA WITH ASTIGMATISM AND PRESBYOPIA, BILATERAL: ICD-10-CM

## 2020-01-24 DIAGNOSIS — I10 ESSENTIAL HYPERTENSION: ICD-10-CM

## 2020-01-24 LAB
EST. AVERAGE GLUCOSE BLD GHB EST-MCNC: 157 MG/DL (ref 68–126)
HBA1C MFR BLD HPLC: 7.1 % (ref ?–5.7)

## 2020-01-24 PROCEDURE — 92015 DETERMINE REFRACTIVE STATE: CPT | Performed by: OPTOMETRIST

## 2020-01-24 PROCEDURE — 92014 COMPRE OPH EXAM EST PT 1/>: CPT | Performed by: OPTOMETRIST

## 2020-01-24 PROCEDURE — 36415 COLL VENOUS BLD VENIPUNCTURE: CPT

## 2020-01-24 PROCEDURE — 99213 OFFICE O/P EST LOW 20 MIN: CPT | Performed by: INTERNAL MEDICINE

## 2020-01-24 PROCEDURE — 83036 HEMOGLOBIN GLYCOSYLATED A1C: CPT

## 2020-01-24 NOTE — PATIENT INSTRUCTIONS
Please avoid painful activity for now and call if your pain does not soon go away. Schedule a testicular ultrasound if you wish. Await results of glycohemoglobin level. Continue current medication. Physical with complete labs in 6 months.

## 2020-01-24 NOTE — PROGRESS NOTES
Porfirio Zimmerman is a 52year old male.  Patient presents with:  Pain: patient complains of pain in the left  testicle,worse when exercisingx 4 days  pain is a 5/10      HPI:   For the past 4 days, he has had persistent pain in his left testis that History:  Social History    Tobacco Use      Smoking status: Never Smoker      Smokeless tobacco: Never Used    Alcohol use:  Yes      Alcohol/week: 1.0 standard drinks      Types: 1 Glasses of wine per week      Comment: Occasional    Drug use: No       EX

## 2020-01-24 NOTE — PROGRESS NOTES
Cecil Henning is a 52year old male. HPI:     HPI     Diabetic Eye Exam     Diabetes characteristics include controlled with diet, Type 2 and taking oral medications. Duration of 6 years. Number of years diabetic 6.   Number of years on pill • atorvastatin 40 MG Oral Tab TAKE 1 TABLET(40 MG) BY MOUTH EVERY NIGHT 90 tablet 1   • Empagliflozin (JARDIANCE) 10 MG Oral Tab Take 1 tablet by mouth once daily. 90 tablet 1   • omega-3 fatty acids 1000 MG Oral Cap Take 1,000 mg by mouth daily. Type:  Progressive bifocal          Manifest Refraction       Sphere Cylinder Axis Dist VA Add    Right -0.75 -1.00 085 20/20 +1.75    Left -0.75 -1.00 090 20/20 +1.75          Final Rx       Sphere Cylinder Axis Dist VA Add    Right -0.75 -1.00 085 20/20

## 2020-01-24 NOTE — TELEPHONE ENCOUNTER
Spoke to patient. Relayed Dr. Elke Dominguez recommendations regarding ibuprofen from below. He verbalized understanding.

## 2020-01-24 NOTE — TELEPHONE ENCOUNTER
Pt states he was seen in the office today for testicle pain, pt is asking if he can take Ibuprofen for this pain. Pt states he did take one Ibuprofen before his OV today. Provided pt with phone number to schedule US as ordered today.  Pt also states he w

## 2020-02-04 ENCOUNTER — APPOINTMENT (OUTPATIENT)
Dept: ULTRASOUND IMAGING | Facility: HOSPITAL | Age: 50
End: 2020-02-04
Attending: EMERGENCY MEDICINE
Payer: COMMERCIAL

## 2020-02-04 ENCOUNTER — HOSPITAL ENCOUNTER (EMERGENCY)
Facility: HOSPITAL | Age: 50
Discharge: HOME | End: 2020-02-04
Attending: EMERGENCY MEDICINE
Payer: COMMERCIAL

## 2020-02-04 ENCOUNTER — TELEPHONE (OUTPATIENT)
Dept: GASTROENTEROLOGY | Facility: CLINIC | Age: 50
End: 2020-02-04

## 2020-02-04 VITALS
BODY MASS INDEX: 30.01 KG/M2 | SYSTOLIC BLOOD PRESSURE: 139 MMHG | HEART RATE: 68 BPM | HEIGHT: 68 IN | RESPIRATION RATE: 18 BRPM | DIASTOLIC BLOOD PRESSURE: 84 MMHG | WEIGHT: 198 LBS | TEMPERATURE: 98 F | OXYGEN SATURATION: 99 %

## 2020-02-04 DIAGNOSIS — N43.3 HYDROCELE, UNSPECIFIED HYDROCELE TYPE: Primary | ICD-10-CM

## 2020-02-04 DIAGNOSIS — N50.819 TESTICLE PAIN: ICD-10-CM

## 2020-02-04 LAB
ANION GAP SERPL CALC-SCNC: 6 MMOL/L (ref 0–18)
BILIRUB UR QL: NEGATIVE
BUN BLD-MCNC: 17 MG/DL (ref 7–18)
BUN/CREAT SERPL: 13.1 (ref 10–20)
CALCIUM BLD-MCNC: 9.3 MG/DL (ref 8.5–10.1)
CHLORIDE SERPL-SCNC: 105 MMOL/L (ref 98–112)
CLARITY UR: CLEAR
CO2 SERPL-SCNC: 26 MMOL/L (ref 21–32)
COLOR UR: COLORLESS
CREAT BLD-MCNC: 1.3 MG/DL (ref 0.7–1.3)
GLUCOSE BLD-MCNC: 119 MG/DL (ref 70–99)
GLUCOSE UR-MCNC: >=500 MG/DL
HGB UR QL STRIP.AUTO: NEGATIVE
KETONES UR-MCNC: NEGATIVE MG/DL
LEUKOCYTE ESTERASE UR QL STRIP.AUTO: NEGATIVE
NITRITE UR QL STRIP.AUTO: NEGATIVE
OSMOLALITY SERPL CALC.SUM OF ELEC: 287 MOSM/KG (ref 275–295)
PH UR: 6 [PH] (ref 5–8)
POTASSIUM SERPL-SCNC: 4.1 MMOL/L (ref 3.5–5.1)
PROT UR-MCNC: NEGATIVE MG/DL
SODIUM SERPL-SCNC: 137 MMOL/L (ref 136–145)
SP GR UR STRIP: 1.01 (ref 1–1.03)
UROBILINOGEN UR STRIP-ACNC: <2

## 2020-02-04 PROCEDURE — 81003 URINALYSIS AUTO W/O SCOPE: CPT | Performed by: EMERGENCY MEDICINE

## 2020-02-04 PROCEDURE — 99284 EMERGENCY DEPT VISIT MOD MDM: CPT

## 2020-02-04 PROCEDURE — 80048 BASIC METABOLIC PNL TOTAL CA: CPT | Performed by: EMERGENCY MEDICINE

## 2020-02-04 PROCEDURE — 80048 BASIC METABOLIC PNL TOTAL CA: CPT

## 2020-02-04 PROCEDURE — 85025 COMPLETE CBC W/AUTO DIFF WBC: CPT | Performed by: EMERGENCY MEDICINE

## 2020-02-04 PROCEDURE — 85060 BLOOD SMEAR INTERPRETATION: CPT | Performed by: EMERGENCY MEDICINE

## 2020-02-04 PROCEDURE — 93975 VASCULAR STUDY: CPT | Performed by: EMERGENCY MEDICINE

## 2020-02-04 PROCEDURE — 36415 COLL VENOUS BLD VENIPUNCTURE: CPT

## 2020-02-04 PROCEDURE — 76870 US EXAM SCROTUM: CPT | Performed by: EMERGENCY MEDICINE

## 2020-02-04 PROCEDURE — 85025 COMPLETE CBC W/AUTO DIFF WBC: CPT

## 2020-02-04 RX ORDER — IBUPROFEN 600 MG/1
600 TABLET ORAL ONCE
Status: COMPLETED | OUTPATIENT
Start: 2020-02-04 | End: 2020-02-04

## 2020-02-04 NOTE — TELEPHONE ENCOUNTER
Spoke with pt. Last seen 3/30/17. States he is experiencing LLQ pain as in the past.  Pain is constant, rates 4/10. Denies fever, body aches or chills. Denies nausea or vomiting. Denies blood in stool, BMs are regular, daily and formed.   Pain has been

## 2020-02-05 ENCOUNTER — TELEPHONE (OUTPATIENT)
Dept: SURGERY | Facility: CLINIC | Age: 50
End: 2020-02-05

## 2020-02-05 ENCOUNTER — DOCUMENTATION ONLY (OUTPATIENT)
Dept: CASE MANAGEMENT | Age: 50
End: 2020-02-05

## 2020-02-05 LAB
BASOPHILS # BLD AUTO: 0.04 X10(3) UL (ref 0–0.2)
BASOPHILS NFR BLD AUTO: 0.5 %
DEPRECATED RDW RBC AUTO: 44 FL (ref 35.1–46.3)
EOSINOPHIL # BLD AUTO: 0.1 X10(3) UL (ref 0–0.7)
EOSINOPHIL NFR BLD AUTO: 1.1 %
ERYTHROCYTE [DISTWIDTH] IN BLOOD BY AUTOMATED COUNT: 14.6 % (ref 11–15)
HCT VFR BLD AUTO: 51.4 % (ref 39–53)
HGB BLD-MCNC: 17.3 G/DL (ref 13–17.5)
IMM GRANULOCYTES # BLD AUTO: 0.01 X10(3) UL (ref 0–1)
IMM GRANULOCYTES NFR BLD: 0.1 %
LYMPHOCYTES # BLD AUTO: 3.81 X10(3) UL (ref 1–4)
LYMPHOCYTES NFR BLD AUTO: 43.7 %
MCH RBC QN AUTO: 28.1 PG (ref 26–34)
MCHC RBC AUTO-ENTMCNC: 33.7 G/DL (ref 31–37)
MCV RBC AUTO: 83.6 FL (ref 80–100)
MONOCYTES # BLD AUTO: 0.44 X10(3) UL (ref 0.1–1)
MONOCYTES NFR BLD AUTO: 5.1 %
NEUTROPHILS # BLD AUTO: 4.31 X10 (3) UL (ref 1.5–7.7)
NEUTROPHILS # BLD AUTO: 4.31 X10(3) UL (ref 1.5–7.7)
NEUTROPHILS NFR BLD AUTO: 49.5 %
PLATELET # BLD AUTO: 291 10(3)UL (ref 150–450)
RBC # BLD AUTO: 6.15 X10(6)UL (ref 4.3–5.7)
WBC # BLD AUTO: 8.7 X10(3) UL (ref 4–11)

## 2020-02-05 NOTE — TELEPHONE ENCOUNTER
D/w patient - he went to urgent care for L testicle pain radiating up to LLQ. US showing hydroceles. I do think this is testicular pain and not diverticulitis. He should f/u with urology and see me in office. He is due for c-scope this year.

## 2020-02-05 NOTE — PROGRESS NOTES
Pt. Called he is having trouble getting apt within a week to see Dr. Chaya Griffin, [facility] [address] [phone]    Called office to make apt.   Apt made with PAT Ureña 1pm [facility]

## 2020-02-05 NOTE — ED PROVIDER NOTES
Patient Seen in: [facility] Emergency Department    History   Patient presents with:  Abdomen/Flank Pain      HPI    Patient presents to the ED complaining of left-sided testicular pain that radiates into his left lower abdomen for the past week. pertinent positives to the presenting problem noted.     Physical Exam     ED Triage Vitals [02/04/20 1834]   BP (!) 164/100   Pulse 78   Resp 20   Temp 98.3 °F (36.8 °C)   Temp src Oral   SpO2 100 %   O2 Device None (Room air)       Physical Exam   Constit Abnormality         Status                                     ---------                               -----------         ------                                     CBC W/ DIFFERENTIAL[[id number]]          Abnormal            Preliminary result testicle without evidence for torsion or infection. Laboratory testing and urinalysis unremarkable. Possible musculoskeletal strain versus hydrocele. Patient referred to general surgery and urology for further work-up and management.     Additional verba

## 2020-02-06 ENCOUNTER — OFFICE VISIT (OUTPATIENT)
Dept: SURGERY | Facility: CLINIC | Age: 50
End: 2020-02-06
Payer: COMMERCIAL

## 2020-02-06 VITALS
DIASTOLIC BLOOD PRESSURE: 80 MMHG | SYSTOLIC BLOOD PRESSURE: 124 MMHG | HEIGHT: 68 IN | BODY MASS INDEX: 30.01 KG/M2 | HEART RATE: 78 BPM | WEIGHT: 198 LBS

## 2020-02-06 DIAGNOSIS — N50.812 PAIN IN LEFT TESTICLE: Primary | ICD-10-CM

## 2020-02-06 PROCEDURE — 99203 OFFICE O/P NEW LOW 30 MIN: CPT | Performed by: NURSE PRACTITIONER

## 2020-02-06 NOTE — PROGRESS NOTES
HPI:    Patient ID: Gerardo Aguilar is a 52year old male. HPI     Patient is a 52year old male who presents to the clinic for a consult regarding left testicular pain. Past medical history of diverticulosis, GERD, HTN, and DMII.     Patient w swelling and urgency. Musculoskeletal: Negative for gait problem. Neurological: Negative for dizziness and light-headedness.          Current Outpatient Medications   Medication Sig Dispense Refill   • lisinopril 10 MG Oral Tab Take 1 tablet (10 mg tota respiratory distress. Abdominal: Soft. He exhibits no distension. There is no tenderness. Genitourinary:    Testes and penis normal.   Right testis shows no mass, no swelling and no tenderness. Left testis shows no mass, no swelling and no tenderness.

## 2020-02-06 NOTE — TELEPHONE ENCOUNTER
PT scheduled with Cherylene Plumb, APN today.      Future Appointments   Date Time Provider Vikram Callejas   2/6/2020  1:00 PM [facility]   2/8/2020  2:45 PM [address] PM [address] EM [facility]   3/13/2020  1:20 PM Ravi Waite MD [facility]

## 2020-02-14 ENCOUNTER — TELEPHONE (OUTPATIENT)
Dept: GASTROENTEROLOGY | Facility: CLINIC | Age: 50
End: 2020-02-14

## 2020-02-14 RX ORDER — METRONIDAZOLE 500 MG/1
500 TABLET ORAL 3 TIMES DAILY
Qty: 30 TABLET | Refills: 0 | Status: SHIPPED | OUTPATIENT
Start: 2020-02-14 | End: 2020-02-24

## 2020-02-14 RX ORDER — LEVOFLOXACIN 750 MG/1
750 TABLET ORAL EVERY 24 HOURS
Qty: 10 TABLET | Refills: 0 | Status: SHIPPED | OUTPATIENT
Start: 2020-02-14 | End: 2020-02-24

## 2020-02-14 NOTE — TELEPHONE ENCOUNTER
Patient discussed with patient, he is having some nagging left lower quadrant pain without any fevers, chills, nausea or vomiting. He is having bowel movements but they are small.   This could be diverticulitis, okay to start empiric therapy with levofloxa

## 2020-02-14 NOTE — TELEPHONE ENCOUNTER
Pt contacted and reviewed Dr. Bobby Davis message below.  He accepted the following appt, directions provided to the [facility] DEJON, and told to arrive 15 mins earlier:  Future Appointments   Date Time Provider Vikram Callejas   2/26/2020 11:00 AM Melisa Flood PA-C [facility]

## 2020-02-14 NOTE — TELEPHONE ENCOUNTER
Spoke with pt. See telephone encounter from 2/4/2020. Patient continues to have LLQ pain. 5/10. BMs are regular in interval but reports they are hard. Denies bleeding. Tylenol helps temporarily but pain always returns. Decreased appetite.   Denies na

## 2020-02-20 DIAGNOSIS — Z00.00 ANNUAL PHYSICAL EXAM: ICD-10-CM

## 2020-02-20 RX ORDER — EMPAGLIFLOZIN 10 MG/1
TABLET, FILM COATED ORAL
Qty: 90 TABLET | Refills: 1 | Status: SHIPPED | OUTPATIENT
Start: 2020-02-20 | End: 2021-09-20

## 2020-02-21 NOTE — TELEPHONE ENCOUNTER
Refill passed per [facility] protocol.   Diabetes Medications  Protocol Criteria:  · Appointment scheduled in the past 6 months or the next 3 months  · A1C < 7.5 in the past 6 months  · Creatinine in the past 12 months  · Creatinine result < 1.5   Rece

## 2020-02-25 ENCOUNTER — TELEPHONE (OUTPATIENT)
Dept: INTERNAL MEDICINE CLINIC | Facility: CLINIC | Age: 50
End: 2020-02-25

## 2020-02-26 ENCOUNTER — TELEPHONE (OUTPATIENT)
Dept: GASTROENTEROLOGY | Facility: CLINIC | Age: 50
End: 2020-02-26

## 2020-02-26 ENCOUNTER — OFFICE VISIT (OUTPATIENT)
Dept: GASTROENTEROLOGY | Facility: CLINIC | Age: 50
End: 2020-02-26
Payer: COMMERCIAL

## 2020-02-26 VITALS
WEIGHT: 195 LBS | SYSTOLIC BLOOD PRESSURE: 127 MMHG | BODY MASS INDEX: 29.55 KG/M2 | DIASTOLIC BLOOD PRESSURE: 85 MMHG | HEIGHT: 68 IN | HEART RATE: 70 BPM

## 2020-02-26 DIAGNOSIS — R10.32 LLQ ABDOMINAL PAIN: ICD-10-CM

## 2020-02-26 DIAGNOSIS — Z12.11 ENCOUNTER FOR SCREENING COLONOSCOPY: Primary | ICD-10-CM

## 2020-02-26 DIAGNOSIS — Z86.010 HISTORY OF COLON POLYPS: ICD-10-CM

## 2020-02-26 DIAGNOSIS — R10.32 LEFT LOWER QUADRANT PAIN: ICD-10-CM

## 2020-02-26 DIAGNOSIS — Z87.19 HISTORY OF DIVERTICULOSIS: ICD-10-CM

## 2020-02-26 DIAGNOSIS — Z12.11 SCREENING FOR COLON CANCER: Primary | ICD-10-CM

## 2020-02-26 PROCEDURE — S0285 CNSLT BEFORE SCREEN COLONOSC: HCPCS | Performed by: PHYSICIAN ASSISTANT

## 2020-02-26 RX ORDER — POLYETHYLENE GLYCOL 3350, SODIUM CHLORIDE, SODIUM BICARBONATE, POTASSIUM CHLORIDE 420; 11.2; 5.72; 1.48 G/4L; G/4L; G/4L; G/4L
POWDER, FOR SOLUTION ORAL
Qty: 1 BOTTLE | Refills: 0 | Status: ON HOLD | OUTPATIENT
Start: 2020-02-26 | End: 2020-07-01

## 2020-02-26 NOTE — TELEPHONE ENCOUNTER
Patient called to advise he was going to  form at office. Per notes patient was advised form has not been received. Patient was given fax number again for Dr. Laura Chavarria. Patient will call back.

## 2020-02-26 NOTE — TELEPHONE ENCOUNTER
Scheduled for:  Colon [address]   Provider Name:  Dr Harshal Baez  Date:  04/22/2020  Location:  [facility]  Sedation: MAC    Time:  0730 (pt is aware to arrive at 0630)    Prep:  Trilyte  Meds/Allergies Reconciled?: Nikki/PA reviewed.     Diagnosis with codes:  Colon Cancer

## 2020-02-26 NOTE — PROGRESS NOTES
[address] [facility] [address] Gastroenterology                                                                                                  Clinic History and Physical     Pa family hx of CRC   - No family history of IBD.     Prior endoscopies:  - CLN per above    Social Hx:  - No smoking/rare ETOH  - Denies illicit drug use   - Occupation:   + originally from [place]  - Lives with wife    History, Medications, Lynnea Babinski Allergies    ROS:   CONSTITUTIONAL:  negative for fevers, rigors  EYES:  negative for diplopia   RESPIRATORY:  negative for severe shortness of breath  CARDIOVASCULAR:  negative for crushing sub-sternal chest pain  GASTROINTESTINAL:  see HPI  GENITOURINARY colon good with washing; PATH from polyps were tubular adenomas therefore 3 year recall advised. # Screening CLN/Hx of Adenomatous Colon Polyps: Patient is currently asymptomatic and denies diarrhea, hematochezia, thin-stools or weight loss.  We discusse answered to the patient’s satisfaction. The patient signed informed consent and elected to proceed with colonoscopy with intervention [i.e. polypectomy, stent placement, etc.] as indicated.     Greater than 50% of this 30 minute spent in face-to-face discus

## 2020-02-26 NOTE — PATIENT INSTRUCTIONS
-Schedule colonoscopy w/ Dr. Sofía Pierre with IV or MAC sedation @ [facility] or [facility]  -Prep: Trilyte split dose preparation   -Medication Changes:  + HOLD Jardiance and Januvia the day before and day of the procedure   ** If MAC @ [facility]/NE:    - HOLD ACE/ARBs the night b

## 2020-04-21 ENCOUNTER — TELEPHONE (OUTPATIENT)
Dept: GASTROENTEROLOGY | Facility: CLINIC | Age: 50
End: 2020-04-21

## 2020-04-21 DIAGNOSIS — R10.32 LLQ ABDOMINAL PAIN: ICD-10-CM

## 2020-04-21 DIAGNOSIS — Z86.010 PERSONAL HISTORY OF COLONIC POLYPS: ICD-10-CM

## 2020-04-21 DIAGNOSIS — Z12.11 COLON CANCER SCREENING: Primary | ICD-10-CM

## 2020-04-21 DIAGNOSIS — Z87.19 HISTORY OF DIVERTICULITIS: ICD-10-CM

## 2020-04-21 NOTE — TELEPHONE ENCOUNTER
Rescheduled for:  Colonoscopy - [address]     Provider Name:  Dr. Gavin Jarvis  Date:  7/1/20    Location:  [facility]  Sedation:  MAC    Time:  10:45 am (pt is aware to arrive at 9:45 am)  Prep:  Trilyte  Meds/Allergies Reconciled?:  Nikki/PA reviewed.     Diagnosis with c

## 2020-05-14 ENCOUNTER — TELEPHONE (OUTPATIENT)
Dept: INTERNAL MEDICINE CLINIC | Facility: CLINIC | Age: 50
End: 2020-05-14

## 2020-05-14 NOTE — TELEPHONE ENCOUNTER
Patient called in stating that he will bring an application from [employer] to receive assistance with medication below.      He will be coming by today to drop it off with his proof of income.     He is requesting a call back wi

## 2020-05-15 NOTE — TELEPHONE ENCOUNTER
Pt dropped off forms to be completed and faxed to [phone].  And pt would like to  the original forms

## 2020-05-15 NOTE — TELEPHONE ENCOUNTER
Dr. Flora Lieberman have you received any forms from this patient nothing has been dropped at nursing station

## 2020-05-18 NOTE — TELEPHONE ENCOUNTER
Patient would like to know if he can  the original forms. He would also like to know if the form has already been faxed. Please advise.

## 2020-05-19 NOTE — TELEPHONE ENCOUNTER
Dr. Ellen Perales will be in office on Friday, a call will be made once documents have been completed

## 2020-05-20 NOTE — TELEPHONE ENCOUNTER
Patient was called and informed that form has been completed and faxed   A copy will be mailed to his house and a copy will be scanned into his chart  Pt verbalized understanding

## 2020-05-26 NOTE — TELEPHONE ENCOUNTER
Called pt and informed that forms have been faxed\    Called Jardiance assistance program and was informed that they have not sorted through their faxes and will do  If there is a problem they will contact the office

## 2020-05-26 NOTE — TELEPHONE ENCOUNTER
Patient called to check on forms. Advised patient that they were refaxed on Thursday, and we received confirmation. Patient wants confirmation that the section that was not originally filled was filled in before it was faxed.  Patient is requesting a call b

## 2020-06-02 NOTE — TELEPHONE ENCOUNTER
Patient stated he spoke with [employer] assistance program and they still have not received fax. Patient requesting a call back with update. Please advise.

## 2020-06-02 NOTE — TELEPHONE ENCOUNTER
Called pt and left a voicemail to call office back  Once patient calls back please inform him that forms have been sent to scanning and have not been uploaded to his chart.   A copy has been mailed to pt and he should receive it soon, forms have been faxed

## 2020-06-04 ENCOUNTER — TELEPHONE (OUTPATIENT)
Dept: INTERNAL MEDICINE CLINIC | Facility: CLINIC | Age: 50
End: 2020-06-04

## 2020-06-04 NOTE — TELEPHONE ENCOUNTER
There is another telephone encounter refill regarding this issue dated today 6/4/20, this nurse closed that encounter and will keep this encounter. See below, once patient called back.

## 2020-06-04 NOTE — TELEPHONE ENCOUNTER
Pt called back to provide phone number for Jardiance assistance program so that medication may be called in # [phone]. Unable to locate form so I'm not sure what was on the form. Arielle please look into this. Call pt when done.

## 2020-06-04 NOTE — TELEPHONE ENCOUNTER
See telephone encounter 5/14/20 forms completion. Will close this encounter. Left message to call back,No MyChart.

## 2020-06-04 NOTE — TELEPHONE ENCOUNTER
Patient called and advised that he gets his prescription for the Jardiance through a Non-Profit Organization because even with insurance he can not afford the medication.  This organization is stating they have tried faxing the refill request over to us for

## 2020-06-04 NOTE — TELEPHONE ENCOUNTER
I have not received any refill request from this organization.  OK to send a 6 month supply of Jardiance to this organization, although I have no contact info for them

## 2020-06-30 ENCOUNTER — LAB ENCOUNTER (OUTPATIENT)
Dept: LAB | Facility: HOSPITAL | Age: 50
End: 2020-06-30
Attending: INTERNAL MEDICINE
Payer: COMMERCIAL

## 2020-06-30 ENCOUNTER — TELEPHONE (OUTPATIENT)
Dept: GASTROENTEROLOGY | Facility: CLINIC | Age: 50
End: 2020-06-30

## 2020-06-30 DIAGNOSIS — Z86.010 PERSONAL HISTORY OF COLONIC POLYPS: ICD-10-CM

## 2020-06-30 DIAGNOSIS — Z87.19 HISTORY OF DIVERTICULOSIS: ICD-10-CM

## 2020-06-30 DIAGNOSIS — Z01.818 PRE-OP TESTING: ICD-10-CM

## 2020-06-30 DIAGNOSIS — R10.32 LLQ ABDOMINAL PAIN: ICD-10-CM

## 2020-06-30 DIAGNOSIS — Z12.11 COLON CANCER SCREENING: ICD-10-CM

## 2020-06-30 LAB — SARS-COV-2 RNA RESP QL NAA+PROBE: NOT DETECTED

## 2020-06-30 NOTE — TELEPHONE ENCOUNTER
Pt contacted and reviewed Dr. Mary Mullen message below, he verbalized understanding of all and will monitor BG. No further questions or concerns at this time.

## 2020-06-30 NOTE — TELEPHONE ENCOUNTER
Call was not transferred to GI RN. I spoke to pt and states he took both the [place] and [place] this morning, but has been following the other prep instructions and aware to hold the lisinopril as directed.      Please advise if pt may proceed wi

## 2020-06-30 NOTE — TELEPHONE ENCOUNTER
Yes, okay to proceed. He should monitor for any signs of low blood sugar, and he should NOT do any diet drinks. He needs some sugar today in the form of clear liquid to make sure he doesn't get hypoglycemic.

## 2020-06-30 NOTE — TELEPHONE ENCOUNTER
Pt states that he has colonoscopy scheduled for tomorrow and he took [place] this morning. Call transferred to RN.

## 2020-07-01 ENCOUNTER — ANESTHESIA (OUTPATIENT)
Dept: ENDOSCOPY | Facility: HOSPITAL | Age: 50
End: 2020-07-01
Payer: COMMERCIAL

## 2020-07-01 ENCOUNTER — ANESTHESIA EVENT (OUTPATIENT)
Dept: ENDOSCOPY | Facility: HOSPITAL | Age: 50
End: 2020-07-01
Payer: COMMERCIAL

## 2020-07-01 ENCOUNTER — TELEPHONE (OUTPATIENT)
Dept: GASTROENTEROLOGY | Facility: CLINIC | Age: 50
End: 2020-07-01

## 2020-07-01 ENCOUNTER — HOSPITAL ENCOUNTER (OUTPATIENT)
Facility: HOSPITAL | Age: 50
Setting detail: HOSPITAL OUTPATIENT SURGERY
Discharge: HOME | End: 2020-07-01
Attending: INTERNAL MEDICINE | Admitting: INTERNAL MEDICINE
Payer: COMMERCIAL

## 2020-07-01 VITALS
HEART RATE: 61 BPM | TEMPERATURE: 98 F | OXYGEN SATURATION: 95 % | SYSTOLIC BLOOD PRESSURE: 121 MMHG | DIASTOLIC BLOOD PRESSURE: 78 MMHG | BODY MASS INDEX: 27.74 KG/M2 | WEIGHT: 183 LBS | RESPIRATION RATE: 20 BRPM | HEIGHT: 68 IN

## 2020-07-01 DIAGNOSIS — Z01.818 PRE-OP TESTING: ICD-10-CM

## 2020-07-01 DIAGNOSIS — Z87.19 HISTORY OF DIVERTICULOSIS: ICD-10-CM

## 2020-07-01 DIAGNOSIS — Z86.010 PERSONAL HISTORY OF COLONIC POLYPS: ICD-10-CM

## 2020-07-01 DIAGNOSIS — Z12.11 COLON CANCER SCREENING: Primary | ICD-10-CM

## 2020-07-01 DIAGNOSIS — R10.32 LLQ ABDOMINAL PAIN: ICD-10-CM

## 2020-07-01 DIAGNOSIS — Z87.19 HISTORY OF DIVERTICULITIS: ICD-10-CM

## 2020-07-01 LAB — GLUCOSE BLDC GLUCOMTR-MCNC: 116 MG/DL (ref 70–99)

## 2020-07-01 PROCEDURE — 0DJD8ZZ INSPECTION OF LOWER INTESTINAL TRACT, VIA NATURAL OR ARTIFICIAL OPENING ENDOSCOPIC: ICD-10-PCS | Performed by: INTERNAL MEDICINE

## 2020-07-01 PROCEDURE — 45378 DIAGNOSTIC COLONOSCOPY: CPT | Performed by: INTERNAL MEDICINE

## 2020-07-01 RX ORDER — MULTIVIT-MIN/FOLIC/VIT K/LYCOP 400-300MCG
TABLET ORAL
COMMUNITY

## 2020-07-01 RX ORDER — SODIUM CHLORIDE, SODIUM LACTATE, POTASSIUM CHLORIDE, CALCIUM CHLORIDE 600; 310; 30; 20 MG/100ML; MG/100ML; MG/100ML; MG/100ML
INJECTION, SOLUTION INTRAVENOUS CONTINUOUS
Status: DISCONTINUED | OUTPATIENT
Start: 2020-07-01 | End: 2020-07-01

## 2020-07-01 RX ORDER — NALOXONE HYDROCHLORIDE 0.4 MG/ML
80 INJECTION, SOLUTION INTRAMUSCULAR; INTRAVENOUS; SUBCUTANEOUS AS NEEDED
Status: DISCONTINUED | OUTPATIENT
Start: 2020-07-01 | End: 2020-07-01

## 2020-07-01 RX ORDER — LIDOCAINE HYDROCHLORIDE 10 MG/ML
INJECTION, SOLUTION EPIDURAL; INFILTRATION; INTRACAUDAL; PERINEURAL AS NEEDED
Status: DISCONTINUED | OUTPATIENT
Start: 2020-07-01 | End: 2020-07-01 | Stop reason: SURG

## 2020-07-01 RX ORDER — DEXTROSE MONOHYDRATE 25 G/50ML
50 INJECTION, SOLUTION INTRAVENOUS
Status: DISCONTINUED | OUTPATIENT
Start: 2020-07-01 | End: 2020-07-01

## 2020-07-01 RX ORDER — ONDANSETRON 2 MG/ML
4 INJECTION INTRAMUSCULAR; INTRAVENOUS ONCE AS NEEDED
Status: DISCONTINUED | OUTPATIENT
Start: 2020-07-01 | End: 2020-07-01

## 2020-07-01 RX ADMIN — SODIUM CHLORIDE, SODIUM LACTATE, POTASSIUM CHLORIDE, CALCIUM CHLORIDE: 600; 310; 30; 20 INJECTION, SOLUTION INTRAVENOUS at 11:12:00

## 2020-07-01 RX ADMIN — LIDOCAINE HYDROCHLORIDE 50 MG: 10 INJECTION, SOLUTION EPIDURAL; INFILTRATION; INTRACAUDAL; PERINEURAL at 10:42:00

## 2020-07-01 NOTE — OPERATIVE REPORT
COLONOSCOPY REPORT    Abdoulaye Roberts     1970 Age 52year old   PCP Marianela Bradley MD Endoscopist Delfina Cooper MD     Date of procedure: 20    Procedure: Colonoscopy     Pre-operative diagnosis: Screening    Post-operative angioectasias or inflammation. 6. CRYSTAL: normal rectal tone, no masses palpated. Impression:   · No polyps noted. · Mild pan-colonic diverticulosis. · Internal hemorrhoids. Recommend:  · Repeat CLN in 5 years due to hx of colon polyps.  If new s
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
## 2020-07-01 NOTE — TELEPHONE ENCOUNTER
Recall colon in 5 years per Dr Jacque Ovalles, due 7/1/2025. Recall entered in epic and health maintenance updated.

## 2020-07-01 NOTE — ANESTHESIA POSTPROCEDURE EVALUATION
Patient: Joni Roberts    Procedure Summary     Date:  07/01/20 Room / Location:  [facility] ENDOSCOPY 01 / [facility] ENDOSCOPY    Anesthesia Start:  8569 Anesthesia Stop:  0897    Procedure:  COLONOSCOPY (N/A ) Diagnosis:       Colon cancer screening      L

## 2020-07-01 NOTE — H&P
History & Physical Examination    Patient Name: Porfirio Zimmerman  MRN: [id number]  [facility]: [id number]  YOB: 1970    Diagnosis: screening for colon cancer    Multiple Vitamins-Minerals ([facility] DAILY MULTIPLE FOR MEN) Oral Tab, Take by mouth Smokeless tobacco: Never Used    Alcohol use:  Yes      Alcohol/week: 1.0 standard drinks      Types: 1 Glasses of wine per week      Comment: Occasional      SYSTEM Check if Review is Normal Check if Physical Exam is Normal If not normal, please explain:

## 2020-07-08 ENCOUNTER — HOSPITAL ENCOUNTER (OUTPATIENT)
Dept: GENERAL RADIOLOGY | Facility: HOSPITAL | Age: 50
Discharge: HOME | End: 2020-07-08
Attending: NURSE PRACTITIONER
Payer: COMMERCIAL

## 2020-07-08 ENCOUNTER — OFFICE VISIT (OUTPATIENT)
Dept: INTERNAL MEDICINE CLINIC | Facility: CLINIC | Age: 50
End: 2020-07-08
Payer: COMMERCIAL

## 2020-07-08 VITALS
RESPIRATION RATE: 22 BRPM | BODY MASS INDEX: 28.95 KG/M2 | HEART RATE: 76 BPM | HEIGHT: 68 IN | SYSTOLIC BLOOD PRESSURE: 133 MMHG | WEIGHT: 191 LBS | DIASTOLIC BLOOD PRESSURE: 83 MMHG

## 2020-07-08 DIAGNOSIS — M54.31 SCIATICA OF RIGHT SIDE: Primary | ICD-10-CM

## 2020-07-08 DIAGNOSIS — R10.84 GENERALIZED ABDOMINAL PAIN: ICD-10-CM

## 2020-07-08 DIAGNOSIS — M54.31 SCIATICA OF RIGHT SIDE: ICD-10-CM

## 2020-07-08 PROCEDURE — 3075F SYST BP GE 130 - 139MM HG: CPT | Performed by: NURSE PRACTITIONER

## 2020-07-08 PROCEDURE — 72110 X-RAY EXAM L-2 SPINE 4/>VWS: CPT | Performed by: NURSE PRACTITIONER

## 2020-07-08 PROCEDURE — 3079F DIAST BP 80-89 MM HG: CPT | Performed by: NURSE PRACTITIONER

## 2020-07-08 PROCEDURE — 99213 OFFICE O/P EST LOW 20 MIN: CPT | Performed by: NURSE PRACTITIONER

## 2020-07-08 PROCEDURE — 3008F BODY MASS INDEX DOCD: CPT | Performed by: NURSE PRACTITIONER

## 2020-07-08 RX ORDER — CYCLOBENZAPRINE HCL 10 MG
10 TABLET ORAL NIGHTLY PRN
Qty: 30 TABLET | Refills: 1 | Status: SHIPPED | OUTPATIENT
Start: 2020-07-08 | End: 2020-08-07

## 2020-07-08 NOTE — ASSESSMENT & PLAN NOTE
A/P-52year-old -American male with pain rating down his right thigh and sometimes a stabbing pain. He went for a job where he sat a lot to now he stands more than 8 hours in the evening. He has a history of using a chiropractor and has not gone.

## 2020-07-08 NOTE — PROGRESS NOTES
HPI:    Patient ID: Juancarlos Brown is a 52year old male. 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  Approx 2 weeks ago developed numbness/tingling to Alcohol/week: 1.0 standard drinks        Types: 1 Glasses of wine per week      Drug use: No         Review of Systems         Current Outpatient Medications   Medication Sig Dispense Refill   • cyclobenzaprine 10 MG Oral Tab Take 1 tablet (10 mg total) by right side and 1+ on the left side. Skin: Skin is warm and dry. No rash noted. Psychiatric: He has a normal mood and affect.  Thought content normal.     /83 (BP Location: Right arm, Patient Position: Sitting, Cuff Size: large)   Pulse 76   Resp 22

## 2020-07-14 ENCOUNTER — NURSE TRIAGE (OUTPATIENT)
Dept: INTERNAL MEDICINE CLINIC | Facility: CLINIC | Age: 50
End: 2020-07-14

## 2020-07-14 NOTE — TELEPHONE ENCOUNTER
Patient indicated that saw Warren Lu yesterday for his right thigh pain. Patient taking the cyclobenzaprine, but it is not helping. Patient wanted to see Dr Zuleyma Espinosa. Appointment scheduled for tomorrow at 2:50pm with Dr Zuleyma Espinosa at the [facility].

## 2020-07-15 ENCOUNTER — OFFICE VISIT (OUTPATIENT)
Dept: INTERNAL MEDICINE CLINIC | Facility: CLINIC | Age: 50
End: 2020-07-15
Payer: COMMERCIAL

## 2020-07-15 VITALS
HEIGHT: 68 IN | RESPIRATION RATE: 20 BRPM | HEART RATE: 88 BPM | DIASTOLIC BLOOD PRESSURE: 84 MMHG | SYSTOLIC BLOOD PRESSURE: 136 MMHG | BODY MASS INDEX: 28.49 KG/M2 | WEIGHT: 188 LBS

## 2020-07-15 DIAGNOSIS — M79.651 RIGHT THIGH PAIN: Primary | ICD-10-CM

## 2020-07-15 PROCEDURE — 3079F DIAST BP 80-89 MM HG: CPT | Performed by: INTERNAL MEDICINE

## 2020-07-15 PROCEDURE — 3008F BODY MASS INDEX DOCD: CPT | Performed by: INTERNAL MEDICINE

## 2020-07-15 PROCEDURE — 99213 OFFICE O/P EST LOW 20 MIN: CPT | Performed by: INTERNAL MEDICINE

## 2020-07-15 PROCEDURE — 3075F SYST BP GE 130 - 139MM HG: CPT | Performed by: INTERNAL MEDICINE

## 2020-07-15 RX ORDER — MELOXICAM 15 MG/1
15 TABLET ORAL DAILY
Qty: 15 TABLET | Refills: 0 | Status: SHIPPED | OUTPATIENT
Start: 2020-07-15 | End: 2020-08-31

## 2020-07-15 RX ORDER — IBUPROFEN 200 MG
400 TABLET ORAL EVERY 6 HOURS PRN
COMMUNITY
End: 2020-08-31

## 2020-07-15 NOTE — PATIENT INSTRUCTIONS
Stop cyclobenzaprine. Take meloxicam 15 mg 1 tablet daily with food. Rest and apply heat to the affected area 2-3 times daily. Call if no better.

## 2020-07-15 NOTE — PROGRESS NOTES
HPI:    Patient ID: Gerardo Aguilar is a 52year old male. 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  Approx 2 weeks ago developed numbness/tingling to Yes        Alcohol/week: 1.0 standard drinks        Types: 1 Glasses of wine per week      Drug use: No         Review of Systems   Constitutional: Negative for chills, fatigue and fever. HENT: Negative for ear pain and hearing loss.     Eyes: Negative fo normal.   Mouth/Throat: Oropharynx is clear and moist.   Eyes: Pupils are equal, round, and reactive to light. Cardiovascular: Normal rate, regular rhythm, normal heart sounds and intact distal pulses. Exam reveals no gallop and no friction rub.     No m x-ray  4) May use his tilt table. 5) Physical therapy ordered         Relevant Orders    XR LUMBAR SPINE (MIN 4 VIEWS) (CPT=72110)    PHYSICAL THERAPY - INTERNAL             No follow-ups on file.     No orders of the defined types were placed in this enco

## 2020-07-15 NOTE — PROGRESS NOTES
Rickey Garcias is a 52year old male. Patient presents with: Other: Patient here with c/o right thigh pain, onset 1 week. HPI:   About 2 weeks ago, he began developing pain in his right anterior thigh.   Previously he worked as a , and emeli (Patient not taking: Reported on 7/15/2020 ) 30 tablet 1     No Known Allergies   Past Medical History:   Diagnosis Date   • Diabetes ([facility] [address]    • Diverticulosis     CT scan 3-17 and colonoscopy 5-17   • Dyslipidemia associated with type 2 diabetes mellitus daily with food. Prescription sent to pharmacy. Call if no better. The patient indicates understanding of these issues and agrees to the plan. The patient is asked to return as needed.     Evelina Leon MD  7/15/2020  3:17 PM

## 2020-07-27 ENCOUNTER — TELEPHONE (OUTPATIENT)
Dept: INTERNAL MEDICINE CLINIC | Facility: CLINIC | Age: 50
End: 2020-07-27

## 2020-07-27 DIAGNOSIS — M79.651 RIGHT THIGH PAIN: Primary | ICD-10-CM

## 2020-07-27 NOTE — TELEPHONE ENCOUNTER
Patient called. Given provider's recommendations and phone number to schedule PT.  Patient verbalized understanding and agreed to plan of care    To schedule Physical Therapy at any of the [facility] facilities, please call   [phone]

## 2020-07-27 NOTE — TELEPHONE ENCOUNTER
Pt states very little improvement in pain in right thigh. Pt has been following all of Dr Lm Webber recommendations and minimal relief. Pt seeking recommendations.     Please advise

## 2020-07-28 ENCOUNTER — OFFICE VISIT (OUTPATIENT)
Dept: PHYSICAL THERAPY | Age: 50
End: 2020-07-28
Attending: INTERNAL MEDICINE
Payer: COMMERCIAL

## 2020-07-28 ENCOUNTER — TELEPHONE (OUTPATIENT)
Dept: PHYSICAL THERAPY | Age: 50
End: 2020-07-28

## 2020-07-28 DIAGNOSIS — M79.651 RIGHT THIGH PAIN: ICD-10-CM

## 2020-07-28 PROCEDURE — 97162 PT EVAL MOD COMPLEX 30 MIN: CPT | Performed by: PHYSICAL THERAPIST

## 2020-07-28 PROCEDURE — 97110 THERAPEUTIC EXERCISES: CPT | Performed by: PHYSICAL THERAPIST

## 2020-07-28 NOTE — PROGRESS NOTES
LUMBAR SPINE EVALUATION:   Referring Physician: Dr. Rodriguez Common  Diagnosis: Right thigh pain (T90.183)     Evaluation Date: 7/28/2020  Visit # 1  Scheduled Visits 8  Insurance Authorized visits [employer]   Date of Onset: July 2020              PATIENT SUMMARY had thoughts of hurting yourself? No    Have you tried to hurt yourself in the past?  No          .  Pt denies diplopia, dysarthria, dysphasia, dizziness, drop attacks, bowel/bladder changes, saddle anesthesia.           ASSESSMENT:   Sherine Diaz presents to p Nerve tensioning: tibial(DF/EV) R neg, Peroneal (PF/INV) R neg, Sural (DF/INV) R neg      Today’s Treatment and Response:  Pt education was provided on exam findings, treatment diagnosis, treatment plan, expectations, and prognosis.  Pt was also provided re planning and for this course of care. Thank you for your referral. Please co-sign or sign and return this letter via fax as soon as possible to [phone].  If you have any questions, please contact me at Dept: [phone]    Sincerely,  Electronicall

## 2020-07-29 ENCOUNTER — TELEPHONE (OUTPATIENT)
Dept: INTERNAL MEDICINE CLINIC | Facility: CLINIC | Age: 50
End: 2020-07-29

## 2020-07-29 NOTE — TELEPHONE ENCOUNTER
Hello,    Patients health plan has denied PA request for CT. Per [employer] clinical does not medical guidelines. Please contact patient for re-direction of care. Please see detail denial letter below. Thank you, Vianey James Specialist    [facility].

## 2020-07-30 ENCOUNTER — OFFICE VISIT (OUTPATIENT)
Dept: PHYSICAL THERAPY | Age: 50
End: 2020-07-30
Attending: INTERNAL MEDICINE
Payer: COMMERCIAL

## 2020-07-30 ENCOUNTER — TELEPHONE (OUTPATIENT)
Dept: INTERNAL MEDICINE CLINIC | Facility: CLINIC | Age: 50
End: 2020-07-30

## 2020-07-30 PROCEDURE — 97110 THERAPEUTIC EXERCISES: CPT | Performed by: PHYSICAL THERAPIST

## 2020-07-30 NOTE — TELEPHONE ENCOUNTER
[facility]    Patient called. 3. 1.58 cm vague area of opacity in the left mid abdomen could be in the kidney or bowel. Follow-up plain film of the abdomen is suggested. Insurance denied CT.   Will follow with a plain film first.    Joaquín Obregon, ANP

## 2020-07-30 NOTE — TELEPHONE ENCOUNTER
Follow up phone call. On lumbar x-ray-    3. 1.58 cm vague area of opacity in the left mid abdomen could be in the kidney or bowel. Follow-up plain film of the abdomen is suggested. I ordered a CT of abdomen and pelvis. Insurance company denied.

## 2020-07-30 NOTE — PROGRESS NOTES
Dx: Right thigh pain (M79.651)           Insurance (Authorized # of Visits):  [employer] 7888 Physician: Dr. Patricia Browning  Next MD visit: none scheduled  Fall Risk: standard         Precautions: n/a             Subjective: Pt reports that he did

## 2020-07-31 ENCOUNTER — HOSPITAL ENCOUNTER (OUTPATIENT)
Dept: GENERAL RADIOLOGY | Facility: HOSPITAL | Age: 50
Discharge: HOME | End: 2020-07-31
Attending: NURSE PRACTITIONER
Payer: COMMERCIAL

## 2020-07-31 DIAGNOSIS — R10.84 GENERALIZED ABDOMINAL PAIN: ICD-10-CM

## 2020-07-31 PROCEDURE — 74018 RADEX ABDOMEN 1 VIEW: CPT | Performed by: NURSE PRACTITIONER

## 2020-08-02 ENCOUNTER — TELEPHONE (OUTPATIENT)
Dept: INTERNAL MEDICINE CLINIC | Facility: CLINIC | Age: 50
End: 2020-08-02

## 2020-08-02 NOTE — TELEPHONE ENCOUNTER
[facility]    Patient called with results of abdominal X-ray. Radiologist suggested an Ultrasound left kidney. This was ordered. Patient lab reviewed. Patient to follow up with Dr. Narendra Vigil.     Trupti López ANP

## 2020-08-04 ENCOUNTER — OFFICE VISIT (OUTPATIENT)
Dept: PHYSICAL THERAPY | Age: 50
End: 2020-08-04
Attending: INTERNAL MEDICINE
Payer: COMMERCIAL

## 2020-08-04 PROCEDURE — 97110 THERAPEUTIC EXERCISES: CPT | Performed by: PHYSICAL THERAPIST

## 2020-08-04 NOTE — PROGRESS NOTES
Dx: Right thigh pain (M79.651)           Insurance (Authorized # of Visits):  [employer] 6519 Physician: Dr. Linnea Lieberman  Next MD visit: none scheduled  Fall Risk: standard         Precautions: n/a             Subjective: Pt reports that he fel

## 2020-08-06 ENCOUNTER — APPOINTMENT (OUTPATIENT)
Dept: PHYSICAL THERAPY | Age: 50
End: 2020-08-06
Attending: INTERNAL MEDICINE
Payer: COMMERCIAL

## 2020-08-11 ENCOUNTER — OFFICE VISIT (OUTPATIENT)
Dept: PHYSICAL THERAPY | Age: 50
End: 2020-08-11
Attending: INTERNAL MEDICINE
Payer: COMMERCIAL

## 2020-08-11 PROCEDURE — 97110 THERAPEUTIC EXERCISES: CPT | Performed by: PHYSICAL THERAPIST

## 2020-08-11 NOTE — PROGRESS NOTES
Dx: Right thigh pain (M79.651)           Insurance (Authorized # of Visits):  [employer] 7806 Physician: Dr. Erin Gloria  Next MD visit: none scheduled  Fall Risk: standard         Precautions: n/a             Subjective: Pt reports that he is handout)    Charges: 3 therex       Total Timed Treatment: 38 min  Total Treatment Time: 38 min

## 2020-08-13 ENCOUNTER — APPOINTMENT (OUTPATIENT)
Dept: PHYSICAL THERAPY | Age: 50
End: 2020-08-13
Attending: INTERNAL MEDICINE
Payer: COMMERCIAL

## 2020-08-14 ENCOUNTER — HOSPITAL ENCOUNTER (OUTPATIENT)
Dept: ULTRASOUND IMAGING | Age: 50
Discharge: HOME | End: 2020-08-14
Attending: NURSE PRACTITIONER
Payer: COMMERCIAL

## 2020-08-14 DIAGNOSIS — R68.89 ABNORMAL FINDING: ICD-10-CM

## 2020-08-14 PROCEDURE — 76770 US EXAM ABDO BACK WALL COMP: CPT | Performed by: NURSE PRACTITIONER

## 2020-08-18 ENCOUNTER — OFFICE VISIT (OUTPATIENT)
Dept: PHYSICAL THERAPY | Age: 50
End: 2020-08-18
Attending: INTERNAL MEDICINE
Payer: COMMERCIAL

## 2020-08-18 PROCEDURE — 97110 THERAPEUTIC EXERCISES: CPT | Performed by: PHYSICAL THERAPIST

## 2020-08-18 NOTE — PROGRESS NOTES
Dx: Right thigh pain (M79.651)           Insurance (Authorized # of Visits):  [employer] 4443 Physician: Dr. Cyrus Parada  Next MD visit: none scheduled  Fall Risk: standard         Precautions: n/a             Subjective: Pt reports that he con x20    Hooklying add with ball 5sec x20  LTR x20  PPT 5sec x20  PPT with march x20  Bridges 5 sec x20  BKFO x20      Neuro    Bosu lunges fwd x20                    HEP: 8/11/20 Bridges, PPT, PPT c march 7/28/20 RFIS x15 3-4 times per day verbal (pt decli

## 2020-08-21 ENCOUNTER — OFFICE VISIT (OUTPATIENT)
Dept: PHYSICAL THERAPY | Age: 50
End: 2020-08-21
Attending: INTERNAL MEDICINE
Payer: COMMERCIAL

## 2020-08-21 PROCEDURE — 97110 THERAPEUTIC EXERCISES: CPT | Performed by: PHYSICAL THERAPIST

## 2020-08-21 NOTE — PROGRESS NOTES
Dx: Right thigh pain (M79.651)           Insurance (Authorized # of Visits):  [facility],  POC          Authorizing Physician: Dr. John Rose  Next MD visit: none scheduled  Fall Risk: standard         Precautions: n/a             Subjective: Pt reports that x20    Hooklying add with ball 5sec x20  LTR x20  PPT 5sec x20  PPT with march x20  Bridges 5 sec x20  BKFO x20   Nustep UE/LE L5 x 8  Min  Hamstring stretch at stairs 30sec x3 R/L  Standing march/abd/ext 2x10 ea  Shuttle B 6 cords x20 R/L 5 cords  RFIL 5s

## 2020-08-26 ENCOUNTER — OFFICE VISIT (OUTPATIENT)
Dept: PHYSICAL THERAPY | Age: 50
End: 2020-08-26
Attending: INTERNAL MEDICINE
Payer: COMMERCIAL

## 2020-08-26 PROCEDURE — 97110 THERAPEUTIC EXERCISES: CPT | Performed by: PHYSICAL THERAPIST

## 2020-08-26 NOTE — PROGRESS NOTES
Dx: Right thigh pain (M79.651)           Insurance (Authorized # of Visits):  [address] [facility],  POC          Authorizing Physician: Dr. Albert Jaquez  Next MD visit: none scheduled  Fall Risk: standard         Precautions: n/a             Subjective: Pt was seen 5mi

## 2020-08-31 ENCOUNTER — OFFICE VISIT (OUTPATIENT)
Dept: INTERNAL MEDICINE CLINIC | Facility: CLINIC | Age: 50
End: 2020-08-31
Payer: COMMERCIAL

## 2020-08-31 VITALS
WEIGHT: 190.38 LBS | SYSTOLIC BLOOD PRESSURE: 134 MMHG | DIASTOLIC BLOOD PRESSURE: 82 MMHG | HEART RATE: 68 BPM | HEIGHT: 68 IN | BODY MASS INDEX: 28.85 KG/M2

## 2020-08-31 DIAGNOSIS — E78.5 DYSLIPIDEMIA ASSOCIATED WITH TYPE 2 DIABETES MELLITUS (HCC): ICD-10-CM

## 2020-08-31 DIAGNOSIS — E11.9 TYPE 2 DIABETES MELLITUS WITHOUT COMPLICATION, WITHOUT LONG-TERM CURRENT USE OF INSULIN (HCC): ICD-10-CM

## 2020-08-31 DIAGNOSIS — Z00.00 ANNUAL PHYSICAL EXAM: Primary | ICD-10-CM

## 2020-08-31 DIAGNOSIS — E11.69 DYSLIPIDEMIA ASSOCIATED WITH TYPE 2 DIABETES MELLITUS (HCC): ICD-10-CM

## 2020-08-31 DIAGNOSIS — D12.6 ADENOMATOUS POLYP OF COLON, UNSPECIFIED PART OF COLON: ICD-10-CM

## 2020-08-31 DIAGNOSIS — I10 ESSENTIAL HYPERTENSION: ICD-10-CM

## 2020-08-31 PROCEDURE — 3079F DIAST BP 80-89 MM HG: CPT | Performed by: INTERNAL MEDICINE

## 2020-08-31 PROCEDURE — 3075F SYST BP GE 130 - 139MM HG: CPT | Performed by: INTERNAL MEDICINE

## 2020-08-31 PROCEDURE — 99396 PREV VISIT EST AGE 40-64: CPT | Performed by: INTERNAL MEDICINE

## 2020-08-31 PROCEDURE — 3008F BODY MASS INDEX DOCD: CPT | Performed by: INTERNAL MEDICINE

## 2020-08-31 NOTE — H&P
Amanda Galicia is a 52year old male who presents for a complete physical exam, as well as for follow-up of type 2 diabetes, hypertension and dyslipidemia. HPI:   His last physical was August 2019. Lately he has been feeling well.   Leg pain is associated with type 2 diabetes mellitus ([facility] [address]    • Esophageal reflux    • Essential hypertension    • Hx of adenomatous colonic polyps 05/2017    none in 2020.  Repeat CLN in 2025   • Liver cyst 6/2017    simple liver cyst on MRI liver   • Type 2 diabetes distal pulses 2+ bilateral dorsalis pedis and posterior tibial  NEURO: Sensory testing with the 10 g monofilament diabetic sensory exam instrument is normal in both feet    ASSESSMENT AND PLAN:   Rigoberto Ruiz is a 52year old male who presents

## 2020-08-31 NOTE — PATIENT INSTRUCTIONS
Please obtain blood and urine testing soon. Get a flu shot this fall as soon as you can. Continue current medication. Continue healthy diet and regular exercise. You will be due for an eye exam in January. Return visit in 6 months.

## 2020-09-01 ENCOUNTER — LAB ENCOUNTER (OUTPATIENT)
Dept: LAB | Facility: HOSPITAL | Age: 50
End: 2020-09-01
Attending: INTERNAL MEDICINE
Payer: COMMERCIAL

## 2020-09-01 ENCOUNTER — APPOINTMENT (OUTPATIENT)
Dept: PHYSICAL THERAPY | Age: 50
End: 2020-09-01
Attending: INTERNAL MEDICINE
Payer: COMMERCIAL

## 2020-09-01 DIAGNOSIS — Z00.00 ANNUAL PHYSICAL EXAM: ICD-10-CM

## 2020-09-01 LAB
ALBUMIN SERPL-MCNC: 3.9 G/DL (ref 3.4–5)
ALBUMIN/GLOB SERPL: 1 {RATIO} (ref 1–2)
ALP LIVER SERPL-CCNC: 86 U/L (ref 45–117)
ALT SERPL-CCNC: 37 U/L (ref 16–61)
ANION GAP SERPL CALC-SCNC: 6 MMOL/L (ref 0–18)
AST SERPL-CCNC: 28 U/L (ref 15–37)
BILIRUB SERPL-MCNC: 0.4 MG/DL (ref 0.1–2)
BUN BLD-MCNC: 12 MG/DL (ref 7–18)
BUN/CREAT SERPL: 10.2 (ref 10–20)
CALCIUM BLD-MCNC: 9.5 MG/DL (ref 8.5–10.1)
CHLORIDE SERPL-SCNC: 109 MMOL/L (ref 98–112)
CHOLEST SMN-MCNC: 208 MG/DL (ref ?–200)
CO2 SERPL-SCNC: 26 MMOL/L (ref 21–32)
CREAT BLD-MCNC: 1.18 MG/DL (ref 0.7–1.3)
CREAT UR-SCNC: 77.5 MG/DL
DEPRECATED RDW RBC AUTO: 45.2 FL (ref 35.1–46.3)
ERYTHROCYTE [DISTWIDTH] IN BLOOD BY AUTOMATED COUNT: 14.9 % (ref 11–15)
EST. AVERAGE GLUCOSE BLD GHB EST-MCNC: 148 MG/DL (ref 68–126)
GLOBULIN PLAS-MCNC: 3.9 G/DL (ref 2.8–4.4)
GLUCOSE BLD-MCNC: 114 MG/DL (ref 70–99)
HBA1C MFR BLD HPLC: 6.8 % (ref ?–5.7)
HCT VFR BLD AUTO: 48.7 % (ref 39–53)
HDLC SERPL-MCNC: 38 MG/DL (ref 40–59)
HGB BLD-MCNC: 15.9 G/DL (ref 13–17.5)
LDLC SERPL CALC-MCNC: 121 MG/DL (ref ?–100)
M PROTEIN MFR SERPL ELPH: 7.8 G/DL (ref 6.4–8.2)
MCH RBC QN AUTO: 27 PG (ref 26–34)
MCHC RBC AUTO-ENTMCNC: 32.6 G/DL (ref 31–37)
MCV RBC AUTO: 82.8 FL (ref 80–100)
MICROALBUMIN UR-MCNC: <0.5 MG/DL
NONHDLC SERPL-MCNC: 170 MG/DL (ref ?–130)
OSMOLALITY SERPL CALC.SUM OF ELEC: 293 MOSM/KG (ref 275–295)
PATIENT FASTING Y/N/NP: YES
PATIENT FASTING Y/N/NP: YES
PLATELET # BLD AUTO: 265 10(3)UL (ref 150–450)
POTASSIUM SERPL-SCNC: 4.1 MMOL/L (ref 3.5–5.1)
RBC # BLD AUTO: 5.88 X10(6)UL (ref 4.3–5.7)
SODIUM SERPL-SCNC: 141 MMOL/L (ref 136–145)
TRIGL SERPL-MCNC: 246 MG/DL (ref 30–149)
VLDLC SERPL CALC-MCNC: 49 MG/DL (ref 0–30)
WBC # BLD AUTO: 6.6 X10(3) UL (ref 4–11)

## 2020-09-01 PROCEDURE — 82043 UR ALBUMIN QUANTITATIVE: CPT

## 2020-09-01 PROCEDURE — 80053 COMPREHEN METABOLIC PANEL: CPT

## 2020-09-01 PROCEDURE — 36415 COLL VENOUS BLD VENIPUNCTURE: CPT

## 2020-09-01 PROCEDURE — 85027 COMPLETE CBC AUTOMATED: CPT

## 2020-09-01 PROCEDURE — 83036 HEMOGLOBIN GLYCOSYLATED A1C: CPT

## 2020-09-01 PROCEDURE — 82570 ASSAY OF URINE CREATININE: CPT

## 2020-09-01 PROCEDURE — 80061 LIPID PANEL: CPT

## 2020-09-01 RX ORDER — AMLODIPINE BESYLATE 10 MG/1
TABLET ORAL
Qty: 90 TABLET | Refills: 1 | Status: SHIPPED | OUTPATIENT
Start: 2020-09-01 | End: 2021-02-24

## 2020-09-01 RX ORDER — LISINOPRIL 10 MG/1
TABLET ORAL
Qty: 90 TABLET | Refills: 1 | Status: SHIPPED | OUTPATIENT
Start: 2020-09-01 | End: 2021-02-24

## 2020-09-03 ENCOUNTER — TELEPHONE (OUTPATIENT)
Dept: PHYSICAL THERAPY | Age: 50
End: 2020-09-03

## 2020-09-03 ENCOUNTER — APPOINTMENT (OUTPATIENT)
Dept: PHYSICAL THERAPY | Age: 50
End: 2020-09-03
Attending: INTERNAL MEDICINE
Payer: COMMERCIAL

## 2020-09-08 ENCOUNTER — OFFICE VISIT (OUTPATIENT)
Dept: PHYSICAL THERAPY | Age: 50
End: 2020-09-08
Attending: INTERNAL MEDICINE
Payer: COMMERCIAL

## 2020-09-08 PROCEDURE — 97530 THERAPEUTIC ACTIVITIES: CPT | Performed by: PHYSICAL THERAPIST

## 2020-09-08 PROCEDURE — 97110 THERAPEUTIC EXERCISES: CPT | Performed by: PHYSICAL THERAPIST

## 2020-09-08 NOTE — PROGRESS NOTES
Rhonda  Pt has attended 8 visits in Physical Therapy.      Dx: Right thigh pain (M79.651)           Insurance (Authorized # of Visits):  [facility], 10 POC          Authorizing Physician: Dr. Emilee Law  Next MD visit: none scheduled  Fall Risk: sta precautions, and treatment options and has agreed to actively participate in planning and for this course of care. Thank you for your referral. If you have any questions, please contact me at Dept: [phone].     Sincerely,  Electronically signed by bi

## 2020-09-10 ENCOUNTER — APPOINTMENT (OUTPATIENT)
Dept: PHYSICAL THERAPY | Age: 50
End: 2020-09-10
Attending: INTERNAL MEDICINE
Payer: COMMERCIAL

## 2021-01-04 LAB — COLONOSCOPY STUDY: NORMAL

## 2021-01-08 ENCOUNTER — TELEPHONE (OUTPATIENT)
Dept: INTERNAL MEDICINE CLINIC | Facility: CLINIC | Age: 51
End: 2021-01-08

## 2021-01-08 DIAGNOSIS — M54.31 SCIATICA OF RIGHT SIDE: Primary | ICD-10-CM

## 2021-01-08 NOTE — TELEPHONE ENCOUNTER
Patient called and he is needing a referral to see his chiropractor. He seems to be new to [facility] so I explained to him in detail on how the referral process works.  It can take 7-10 business days for us to receive an authorization and will need to wait to DR SHAMA RIVERO

## 2021-01-13 ENCOUNTER — OFFICE VISIT (OUTPATIENT)
Dept: INTERNAL MEDICINE CLINIC | Facility: CLINIC | Age: 51
End: 2021-01-13

## 2021-01-13 VITALS
HEART RATE: 92 BPM | BODY MASS INDEX: 28.95 KG/M2 | HEIGHT: 68 IN | SYSTOLIC BLOOD PRESSURE: 134 MMHG | WEIGHT: 191 LBS | DIASTOLIC BLOOD PRESSURE: 72 MMHG

## 2021-01-13 DIAGNOSIS — M54.9 CHRONIC MIDLINE BACK PAIN, UNSPECIFIED BACK LOCATION: Primary | ICD-10-CM

## 2021-01-13 DIAGNOSIS — G89.29 CHRONIC MIDLINE BACK PAIN, UNSPECIFIED BACK LOCATION: Primary | ICD-10-CM

## 2021-01-13 PROCEDURE — 3075F SYST BP GE 130 - 139MM HG: CPT | Performed by: INTERNAL MEDICINE

## 2021-01-13 PROCEDURE — 3078F DIAST BP <80 MM HG: CPT | Performed by: INTERNAL MEDICINE

## 2021-01-13 PROCEDURE — 3008F BODY MASS INDEX DOCD: CPT | Performed by: INTERNAL MEDICINE

## 2021-01-13 PROCEDURE — 99213 OFFICE O/P EST LOW 20 MIN: CPT | Performed by: INTERNAL MEDICINE

## 2021-01-13 NOTE — PROGRESS NOTES
Betzy Alejo is a 48year old male. Patient presents with:  Referral: pt needs referral for chiropractor     HPI:   Raymond Falcon presents this morning requesting a referral to a chiropractor.     He has been seeing an outside chiropractor fairly [facility] Social History:  Social History    Tobacco Use      Smoking status: Never Smoker      Smokeless tobacco: Never Used    Alcohol use:  Yes      Alcohol/week: 1.0 standard drinks      Types: 1 Glasses of wine per week      Comment: Infrequent wine    Drug

## 2021-01-13 NOTE — PATIENT INSTRUCTIONS
Please schedule an appointment with Dr. Charmayne Patterson. Follow-up in about 2 months as planned.

## 2021-01-14 ENCOUNTER — TELEPHONE (OUTPATIENT)
Dept: INTERNAL MEDICINE CLINIC | Facility: CLINIC | Age: 51
End: 2021-01-14

## 2021-01-14 NOTE — TELEPHONE ENCOUNTER
Patient was inform to call office with chiropractor information for referral. Patient is requesting referral to be sent to Dr. Wily Alexandra, patient asking to contact Du Cardona directly at [phone] for more information.

## 2021-01-14 NOTE — TELEPHONE ENCOUNTER
Spoke to patient to informed Dr. Jonathan Perez is not in-network with [employer] plan. Patient insisted I call Dr. Mariana Neumannr office. Called Dr. Mariana Restrepo office, doctor answered the phone states she accepts [facility] but unsure if in-network with [employer].      Called manage

## 2021-01-19 ENCOUNTER — TELEPHONE (OUTPATIENT)
Dept: INTERNAL MEDICINE CLINIC | Facility: CLINIC | Age: 51
End: 2021-01-19

## 2021-01-19 NOTE — TELEPHONE ENCOUNTER
Referral  Referral # [id number]  Status History    Date Change User   01/18/2021 From Open to Pending Review Benji Baker

## 2021-01-22 NOTE — TELEPHONE ENCOUNTER
Referral Notes  Number of Notes: 6  .   Type Date User Summary Attachment   PCP Written Notification of Approval 01/21/2021 12:44 PM Dave Fay - -   Note    PCP OFFICE NOTIFIED THIS REQUEST IS APPROVED VIA [employer] MESSAGE      [employer]

## 2021-02-02 ENCOUNTER — TELEPHONE (OUTPATIENT)
Dept: INTERNAL MEDICINE CLINIC | Facility: CLINIC | Age: 51
End: 2021-02-02

## 2021-02-02 NOTE — TELEPHONE ENCOUNTER
Patient received a letter from Dr. Sharlene Childs regarding [facility] that he recommended a primary care physician if he decided to change physicians. Patient wants to keep Dr. Sharlene Childs as his physician. Why is Dr. Sharlene Childs referring me to another physician.

## 2021-02-03 NOTE — TELEPHONE ENCOUNTER
Called pt and informed that the letter that he received was not from [place]. Upon discussing with patient he informed this MA that the letter was from [facility], informing about PCP an who he had chosen.   Pt verbalized understanding

## 2021-02-15 ENCOUNTER — TELEPHONE (OUTPATIENT)
Dept: INTERNAL MEDICINE CLINIC | Facility: CLINIC | Age: 51
End: 2021-02-15

## 2021-02-15 DIAGNOSIS — M54.9 CHRONIC MIDLINE BACK PAIN, UNSPECIFIED BACK LOCATION: Primary | ICD-10-CM

## 2021-02-15 DIAGNOSIS — G89.29 CHRONIC MIDLINE BACK PAIN, UNSPECIFIED BACK LOCATION: Primary | ICD-10-CM

## 2021-02-15 NOTE — TELEPHONE ENCOUNTER
Called Dr. Kimberly Louis office and asked for LOV notes to be faxed to Dr. Mikaela Cervantes per PCP request.  Per  notes will be faxed over      Will be looking out for notes

## 2021-02-15 NOTE — TELEPHONE ENCOUNTER
Patient, would like another referral to see his chiropractor Roberto Polanco. Patient states that tomorrow will be his 6th visit. Pt is requesting another referral for 6 more visit. Please, call pt with any questions.

## 2021-02-15 NOTE — TELEPHONE ENCOUNTER
Dr. Andrew Lu, patient is requesting a referral to Dr. Mitch Georges. Referral has been pended, please advise.      Please reply to pool:  MANAGED CARE - MAIN

## 2021-02-15 NOTE — TELEPHONE ENCOUNTER
Please obtain office visit notes from Dr. Selena Blas for me to review before I authorize additional visits

## 2021-02-16 NOTE — TELEPHONE ENCOUNTER
No faxes have been received as of yet, I called Dr. Rhona March office again and asked them to refax LOV notes to [phone].  Will be waiting for fax

## 2021-02-22 NOTE — TELEPHONE ENCOUNTER
Left detailed message on patient's voicemail. Referral was printed and faxed to Dr. Beba Ugalde at [phone]. Confirmation received.

## 2021-02-24 DIAGNOSIS — Z00.00 ANNUAL PHYSICAL EXAM: ICD-10-CM

## 2021-02-24 RX ORDER — LISINOPRIL 10 MG/1
10 TABLET ORAL DAILY
Qty: 90 TABLET | Refills: 0 | Status: SHIPPED | OUTPATIENT
Start: 2021-02-24 | End: 2021-05-14

## 2021-02-24 RX ORDER — AMLODIPINE BESYLATE 10 MG/1
10 TABLET ORAL DAILY
Qty: 90 TABLET | Refills: 0 | Status: SHIPPED | OUTPATIENT
Start: 2021-02-24 | End: 2021-05-14

## 2021-03-15 ENCOUNTER — TELEPHONE (OUTPATIENT)
Dept: INTERNAL MEDICINE CLINIC | Facility: CLINIC | Age: 51
End: 2021-03-15

## 2021-03-15 DIAGNOSIS — Z00.00 ANNUAL PHYSICAL EXAM: Primary | ICD-10-CM

## 2021-03-15 NOTE — TELEPHONE ENCOUNTER
Pt would like a referral to see Dr. Abdelrahman Fatima for his routine yearly eye exam. Pt does not have an appointment scheduled yet. Please, call pt when the referral is approved.

## 2021-03-17 ENCOUNTER — TELEPHONE (OUTPATIENT)
Dept: CASE MANAGEMENT | Age: 51
End: 2021-03-17

## 2021-03-17 DIAGNOSIS — E11.9 TYPE 2 DIABETES MELLITUS WITHOUT COMPLICATION, WITHOUT LONG-TERM CURRENT USE OF INSULIN (HCC): Primary | ICD-10-CM

## 2021-03-17 NOTE — TELEPHONE ENCOUNTER
Hi Dr. Lisa Carlos,      Dr. Xu Jacques office sent fax referral request for additional chiro visits. Please advise and sign off on referral if you agree. Thank you, Juli Salas Specialist    Managed Care.

## 2021-03-17 NOTE — TELEPHONE ENCOUNTER
Spoke with patient this afternoon 3-17. Back pain now significantly better. He does not believe he needs any additional chiropractic treatment at this time. Chiropractic referral canceled. Referral completed for appointment with Dr. Teri Heller for annual diabetic eye exam.  He will schedule. Also recommend appointment with me soon for diabetes check.

## 2021-03-25 NOTE — TELEPHONE ENCOUNTER
Patient is scheduled with Dr. Rey Jones on 04/20/2021.     Date Change User   03/18/2021 From Open to Authorized Ab Dhillon

## 2021-03-29 ENCOUNTER — OFFICE VISIT (OUTPATIENT)
Dept: INTERNAL MEDICINE CLINIC | Facility: CLINIC | Age: 51
End: 2021-03-29

## 2021-03-29 VITALS
HEIGHT: 68 IN | HEART RATE: 92 BPM | DIASTOLIC BLOOD PRESSURE: 72 MMHG | WEIGHT: 192.69 LBS | BODY MASS INDEX: 29.2 KG/M2 | SYSTOLIC BLOOD PRESSURE: 128 MMHG | RESPIRATION RATE: 18 BRPM

## 2021-03-29 DIAGNOSIS — I10 ESSENTIAL HYPERTENSION: ICD-10-CM

## 2021-03-29 DIAGNOSIS — E11.9 TYPE 2 DIABETES MELLITUS WITHOUT COMPLICATION, WITHOUT LONG-TERM CURRENT USE OF INSULIN (HCC): Primary | ICD-10-CM

## 2021-03-29 LAB
CARTRIDGE LOT#: ABNORMAL NUMERIC
HEMOGLOBIN A1C: 7 % (ref 4.3–5.6)

## 2021-03-29 PROCEDURE — 36416 COLLJ CAPILLARY BLOOD SPEC: CPT | Performed by: INTERNAL MEDICINE

## 2021-03-29 PROCEDURE — 83036 HEMOGLOBIN GLYCOSYLATED A1C: CPT | Performed by: INTERNAL MEDICINE

## 2021-03-29 PROCEDURE — 3008F BODY MASS INDEX DOCD: CPT | Performed by: INTERNAL MEDICINE

## 2021-03-29 PROCEDURE — 99214 OFFICE O/P EST MOD 30 MIN: CPT | Performed by: INTERNAL MEDICINE

## 2021-03-29 PROCEDURE — 3078F DIAST BP <80 MM HG: CPT | Performed by: INTERNAL MEDICINE

## 2021-03-29 PROCEDURE — 3074F SYST BP LT 130 MM HG: CPT | Performed by: INTERNAL MEDICINE

## 2021-03-29 PROCEDURE — 3051F HG A1C>EQUAL 7.0%<8.0%: CPT | Performed by: INTERNAL MEDICINE

## 2021-03-29 NOTE — PATIENT INSTRUCTIONS
Your glycohemoglobin level today was well controlled at 7.0%. Continue current medication. Continue healthy diet and regular exercise. Please schedule a physical with labs in 6 months.

## 2021-03-29 NOTE — PROGRESS NOTES
Marcella Sandoval is a 48year old male. Patient presents with:  Diabetes    HPI:   Gianfranco Crabtree presents this afternoon for 6-month follow-up of type 2 diabetes and hypertension. Lately he has been feeling well.   Accu-Cheks average 100-120 without hy Tobacco Use      Smoking status: Never Smoker      Smokeless tobacco: Never Used    Vaping Use      Vaping Use: Never used    Alcohol use:  Yes      Alcohol/week: 1.0 standard drinks      Types: 1 Glasses of wine per week      Comment: Infrequent wine    Dr

## 2021-04-20 ENCOUNTER — OFFICE VISIT (OUTPATIENT)
Dept: OPTOMETRY | Facility: CLINIC | Age: 51
End: 2021-04-20

## 2021-04-20 DIAGNOSIS — E11.9 TYPE 2 DIABETES MELLITUS WITHOUT COMPLICATION, WITHOUT LONG-TERM CURRENT USE OF INSULIN (HCC): Primary | ICD-10-CM

## 2021-04-20 DIAGNOSIS — H52.203 MYOPIA WITH ASTIGMATISM AND PRESBYOPIA, BILATERAL: ICD-10-CM

## 2021-04-20 DIAGNOSIS — H52.4 MYOPIA WITH ASTIGMATISM AND PRESBYOPIA, BILATERAL: ICD-10-CM

## 2021-04-20 DIAGNOSIS — H25.13 AGE-RELATED NUCLEAR CATARACT OF BOTH EYES: ICD-10-CM

## 2021-04-20 DIAGNOSIS — H52.13 MYOPIA WITH ASTIGMATISM AND PRESBYOPIA, BILATERAL: ICD-10-CM

## 2021-04-20 PROCEDURE — 92015 DETERMINE REFRACTIVE STATE: CPT | Performed by: OPTOMETRIST

## 2021-04-20 PROCEDURE — 92014 COMPRE OPH EXAM EST PT 1/>: CPT | Performed by: OPTOMETRIST

## 2021-04-20 NOTE — PROGRESS NOTES
Ronen Stewart is a 48year old male.     HPI:     HPI     Diabetic Eye Exam     Diabetes Type: Type 2, controlled with diet and taking oral medications    Duration: 7 years    Number of years diabetic: 7    Number of years on pills: 7    Number o tablet 0   • amLODIPine Besylate 10 MG Oral Tab Take 1 tablet (10 mg total) by mouth daily.  90 tablet 0   • SITagliptin Phosphate (JANUVIA) 100 MG Oral Tab TAKE 1 TABLET(100 MG) BY MOUTH DAILY 90 tablet 0   • Multiple Vitamins-Minerals ([facility] DAILY MULTIPLE Normal    Periphery Normal Normal            Refraction     Wearing Rx       Sphere Cylinder Axis Add    Right -0.75 -0.75 085 +1.50    Left -0.75 -1.00 090 +1.50    Type: Progressive bifocal          Manifest Refraction       Sphere Cylinder Axis Dist VA

## 2021-05-14 DIAGNOSIS — Z00.00 ANNUAL PHYSICAL EXAM: ICD-10-CM

## 2021-05-14 RX ORDER — AMLODIPINE BESYLATE 10 MG/1
10 TABLET ORAL DAILY
Qty: 90 TABLET | Refills: 1 | Status: SHIPPED | OUTPATIENT
Start: 2021-05-14 | End: 2021-11-10

## 2021-05-14 RX ORDER — LISINOPRIL 10 MG/1
10 TABLET ORAL DAILY
Qty: 90 TABLET | Refills: 1 | Status: SHIPPED | OUTPATIENT
Start: 2021-05-14 | End: 2021-11-10

## 2021-06-01 ENCOUNTER — APPOINTMENT (OUTPATIENT)
Dept: CT IMAGING | Facility: HOSPITAL | Age: 51
End: 2021-06-01
Attending: EMERGENCY MEDICINE
Payer: COMMERCIAL

## 2021-06-01 ENCOUNTER — NURSE TRIAGE (OUTPATIENT)
Dept: INTERNAL MEDICINE CLINIC | Facility: CLINIC | Age: 51
End: 2021-06-01

## 2021-06-01 ENCOUNTER — APPOINTMENT (OUTPATIENT)
Dept: GENERAL RADIOLOGY | Facility: HOSPITAL | Age: 51
End: 2021-06-01
Attending: EMERGENCY MEDICINE
Payer: COMMERCIAL

## 2021-06-01 ENCOUNTER — HOSPITAL ENCOUNTER (EMERGENCY)
Facility: HOSPITAL | Age: 51
Discharge: HOME | End: 2021-06-01
Attending: EMERGENCY MEDICINE
Payer: COMMERCIAL

## 2021-06-01 VITALS
BODY MASS INDEX: 28 KG/M2 | OXYGEN SATURATION: 99 % | DIASTOLIC BLOOD PRESSURE: 86 MMHG | TEMPERATURE: 98 F | SYSTOLIC BLOOD PRESSURE: 118 MMHG | HEART RATE: 62 BPM | RESPIRATION RATE: 16 BRPM | WEIGHT: 187 LBS

## 2021-06-01 DIAGNOSIS — R07.9 CHEST PAIN OF UNCERTAIN ETIOLOGY: Primary | ICD-10-CM

## 2021-06-01 PROCEDURE — 84484 ASSAY OF TROPONIN QUANT: CPT

## 2021-06-01 PROCEDURE — 82962 GLUCOSE BLOOD TEST: CPT

## 2021-06-01 PROCEDURE — 80048 BASIC METABOLIC PNL TOTAL CA: CPT | Performed by: EMERGENCY MEDICINE

## 2021-06-01 PROCEDURE — 71045 X-RAY EXAM CHEST 1 VIEW: CPT | Performed by: EMERGENCY MEDICINE

## 2021-06-01 PROCEDURE — 93010 ELECTROCARDIOGRAM REPORT: CPT | Performed by: EMERGENCY MEDICINE

## 2021-06-01 PROCEDURE — 80048 BASIC METABOLIC PNL TOTAL CA: CPT

## 2021-06-01 PROCEDURE — 85025 COMPLETE CBC W/AUTO DIFF WBC: CPT | Performed by: EMERGENCY MEDICINE

## 2021-06-01 PROCEDURE — 99285 EMERGENCY DEPT VISIT HI MDM: CPT

## 2021-06-01 PROCEDURE — 75574 CT ANGIO HRT W/3D IMAGE: CPT | Performed by: EMERGENCY MEDICINE

## 2021-06-01 PROCEDURE — 85060 BLOOD SMEAR INTERPRETATION: CPT | Performed by: EMERGENCY MEDICINE

## 2021-06-01 PROCEDURE — 85379 FIBRIN DEGRADATION QUANT: CPT | Performed by: EMERGENCY MEDICINE

## 2021-06-01 PROCEDURE — 75574 CT ANGIO HRT W/3D IMAGE: CPT | Performed by: INTERNAL MEDICINE

## 2021-06-01 PROCEDURE — 93005 ELECTROCARDIOGRAM TRACING: CPT

## 2021-06-01 PROCEDURE — 85025 COMPLETE CBC W/AUTO DIFF WBC: CPT

## 2021-06-01 PROCEDURE — 96374 THER/PROPH/DIAG INJ IV PUSH: CPT

## 2021-06-01 PROCEDURE — 84484 ASSAY OF TROPONIN QUANT: CPT | Performed by: EMERGENCY MEDICINE

## 2021-06-01 RX ORDER — METOPROLOL TARTRATE 5 MG/5ML
5 INJECTION INTRAVENOUS SEE ADMIN INSTRUCTIONS
Status: DISCONTINUED | OUTPATIENT
Start: 2021-06-01 | End: 2021-06-01

## 2021-06-01 RX ORDER — ALPRAZOLAM 0.25 MG/1
0.25 TABLET ORAL
Status: DISCONTINUED | OUTPATIENT
Start: 2021-06-01 | End: 2021-06-01

## 2021-06-01 RX ORDER — DILTIAZEM HYDROCHLORIDE 5 MG/ML
5 INJECTION INTRAVENOUS SEE ADMIN INSTRUCTIONS
Status: DISCONTINUED | OUTPATIENT
Start: 2021-06-01 | End: 2021-06-01

## 2021-06-01 RX ORDER — METOPROLOL TARTRATE 50 MG/1
100 TABLET, FILM COATED ORAL ONCE AS NEEDED
Status: DISCONTINUED | OUTPATIENT
Start: 2021-06-01 | End: 2021-06-01

## 2021-06-01 RX ORDER — METOPROLOL TARTRATE 50 MG/1
100 TABLET, FILM COATED ORAL ONCE AS NEEDED
Status: COMPLETED | OUTPATIENT
Start: 2021-06-01 | End: 2021-06-01

## 2021-06-01 RX ORDER — NITROGLYCERIN 0.4 MG/1
0.4 TABLET SUBLINGUAL ONCE
Status: COMPLETED | OUTPATIENT
Start: 2021-06-01 | End: 2021-06-01

## 2021-06-01 RX ORDER — METOPROLOL TARTRATE 50 MG/1
50 TABLET, FILM COATED ORAL ONCE AS NEEDED
Status: COMPLETED | OUTPATIENT
Start: 2021-06-01 | End: 2021-06-01

## 2021-06-01 RX ORDER — METOPROLOL TARTRATE 50 MG/1
50 TABLET, FILM COATED ORAL ONCE AS NEEDED
Status: DISCONTINUED | OUTPATIENT
Start: 2021-06-01 | End: 2021-06-01

## 2021-06-01 RX ORDER — METOPROLOL TARTRATE 5 MG/5ML
INJECTION INTRAVENOUS
Status: COMPLETED
Start: 2021-06-01 | End: 2021-06-01

## 2021-06-01 NOTE — ED PROVIDER NOTES
Patient Seen in: [facility] AND [facility] Emergency Department      History   Patient presents with:  Chest Pain Angina    Stated Complaint: chest pain    HPI/Subjective:   HPI    Patient presents to the emergency department complaining of left-sided chest pain Exam     ED Triage Vitals [06/01/21 1235]   /84   Pulse 83   Resp 18   Temp 98 °F (36.7 °C)   Temp src    SpO2 98 %   O2 Device        Current:/86   Pulse 62   Temp 98 °F (36.7 °C)   Resp 16   Wt 84.8 kg   SpO2 99%   BMI 28.43 kg/m²         Phy ------                     CBC W/ DIFFERENTIAL[[id number]]          Abnormal            Final result                 Please view results for these tests on the individual orders.    RAINBOW DRAW LAVENDER   RAINBOW DRAW LIGHT GREEN   RAINBOW DRAW GOLD

## 2021-06-01 NOTE — TELEPHONE ENCOUNTER
Action Requested: Summary for Provider     []  Critical Lab, Recommendations Needed  [] Need Additional Advice  [x]   FYI    []   Need Orders  [] Need Medications Sent to Pharmacy  []  Other     SUMMARY: Patient c/o of upper back pain that radiates to the

## 2021-06-01 NOTE — IMAGING NOTE
TO CT AT 1550      HX TAKEN : CHEST PAIN    PT CONSENTED IN ER     BASELINE VITAL SIGNS   HR 65  /81 BMI 28.43 /  LB    CTA ORDERED BY DR OLEARY WAS PT GIVEN CTA  PREMEDS NO,     METOPROLOL PO GIVEN 100 MILLIGRAMS  AT 1408 IN ER    18 GAUGE IV

## 2021-06-01 NOTE — ED PROVIDER NOTES
S/o from Dr. Karla Mckinnon pending coronary CTA. Called by Dr. Rocío Ji. Reviewed by Dr. Rocío Ji as nml. Pt will be d/c'ed home w/ outpt f/u.

## 2021-06-01 NOTE — TELEPHONE ENCOUNTER
Spoke with pt,  verified, pt stated today is his 2nd covid vaccine shot at [facility] and he would like to know if he can get his shot before he goes to ER?    Pt was advised to go to ER as recommended earlier today and depending on the outcome of his vi

## 2021-06-02 NOTE — TELEPHONE ENCOUNTER
ED  Discharged   6/1/2021 (4 hours)  [address]  Ed Desai MD  Last attending • Treatment team  Chest pain of uncertain etiology  Clinical impression  Chest Pain Angina  Chief complaint   ED Provider Notes  Radha Hamm

## 2021-06-13 DIAGNOSIS — Z00.00 ANNUAL PHYSICAL EXAM: ICD-10-CM

## 2021-07-31 DIAGNOSIS — Z00.00 ANNUAL PHYSICAL EXAM: ICD-10-CM

## 2021-08-01 RX ORDER — ATORVASTATIN CALCIUM 40 MG/1
TABLET, FILM COATED ORAL
Qty: 90 TABLET | Refills: 1 | Status: SHIPPED | OUTPATIENT
Start: 2021-08-01

## 2021-09-11 DIAGNOSIS — Z00.00 ANNUAL PHYSICAL EXAM: ICD-10-CM

## 2021-09-20 DIAGNOSIS — Z00.00 ANNUAL PHYSICAL EXAM: ICD-10-CM

## 2021-09-20 NOTE — TELEPHONE ENCOUNTER
Refill passed per [place] [facility] protocol. Requested Prescriptions   Pending Prescriptions Disp Refills    empagliflozin (JARDIANCE) 10 MG Oral Tab 90 tablet 1     Sig: Take 1 tablet (10 mg total) by mouth daily.         Diabetes Medication Protocol Passed - 9/20/2021  1:23 PM        Passed - Last A1C < 7.5 and within past 6 months     Lab Results   Component Value Date    A1C 7.0 (A) 03/29/2021               Passed - Appointment in past 6 or next 3 months        Passed - GFR  > 50     Lab Results   Component Value Date    GFRAA 94 06/01/2021                 Passed - GFR in the past 12 months               Future Appointments         Provider Department Appt Notes    In 2 days Myke Rainey MD [facility], [address], [place] physical             Recent Outpatient Visits              5 months ago Type 2 diabetes mellitus without complication, without long-term current use of insulin [facility])    [facility]    Office Visit    5 months ago Type 2 diabetes mellitus without complication, without long-term current use of insulin [facility])    [facility], [address], Mohamud Murphy MD    Office Visit    8 months ago Chronic midline back pain, unspecified back location    Nohemy Lockett MD    Office Visit    1 year ago     [facility] in [place], [place]    Office Visit    1 year ago Annual physical exam    [facility], Mohamud Murphy MD    Office Visit

## 2021-09-22 ENCOUNTER — OFFICE VISIT (OUTPATIENT)
Dept: INTERNAL MEDICINE CLINIC | Facility: CLINIC | Age: 51
End: 2021-09-22

## 2021-09-22 VITALS
WEIGHT: 192.19 LBS | HEART RATE: 69 BPM | HEIGHT: 68 IN | SYSTOLIC BLOOD PRESSURE: 128 MMHG | BODY MASS INDEX: 29.13 KG/M2 | DIASTOLIC BLOOD PRESSURE: 72 MMHG

## 2021-09-22 DIAGNOSIS — E11.9 TYPE 2 DIABETES MELLITUS WITHOUT COMPLICATION, WITHOUT LONG-TERM CURRENT USE OF INSULIN (HCC): ICD-10-CM

## 2021-09-22 DIAGNOSIS — E78.5 DYSLIPIDEMIA ASSOCIATED WITH TYPE 2 DIABETES MELLITUS (HCC): ICD-10-CM

## 2021-09-22 DIAGNOSIS — E11.69 DYSLIPIDEMIA ASSOCIATED WITH TYPE 2 DIABETES MELLITUS (HCC): ICD-10-CM

## 2021-09-22 DIAGNOSIS — Z00.00 ANNUAL PHYSICAL EXAM: Primary | ICD-10-CM

## 2021-09-22 DIAGNOSIS — D12.6 ADENOMATOUS POLYP OF COLON, UNSPECIFIED PART OF COLON: ICD-10-CM

## 2021-09-22 DIAGNOSIS — I10 ESSENTIAL HYPERTENSION: ICD-10-CM

## 2021-09-22 PROCEDURE — 99396 PREV VISIT EST AGE 40-64: CPT | Performed by: INTERNAL MEDICINE

## 2021-09-22 PROCEDURE — G0438 PPPS, INITIAL VISIT: HCPCS | Performed by: INTERNAL MEDICINE

## 2021-09-22 PROCEDURE — 90472 IMMUNIZATION ADMIN EACH ADD: CPT | Performed by: INTERNAL MEDICINE

## 2021-09-22 PROCEDURE — 90686 IIV4 VACC NO PRSV 0.5 ML IM: CPT | Performed by: INTERNAL MEDICINE

## 2021-09-22 PROCEDURE — 3078F DIAST BP <80 MM HG: CPT | Performed by: INTERNAL MEDICINE

## 2021-09-22 PROCEDURE — 90750 HZV VACC RECOMBINANT IM: CPT | Performed by: INTERNAL MEDICINE

## 2021-09-22 PROCEDURE — 3074F SYST BP LT 130 MM HG: CPT | Performed by: INTERNAL MEDICINE

## 2021-09-22 PROCEDURE — 90471 IMMUNIZATION ADMIN: CPT | Performed by: INTERNAL MEDICINE

## 2021-09-22 PROCEDURE — 3008F BODY MASS INDEX DOCD: CPT | Performed by: INTERNAL MEDICINE

## 2021-09-22 NOTE — PATIENT INSTRUCTIONS
Your blood pressure today was excellent at 128/72 and your exam was normal.  You received a flu shot and the first Shingrix vaccine today. The second Shingrix vaccine should be in 2-6 months. Please come in soon for blood and urine testing.   Continue cur

## 2021-09-22 NOTE — H&P
Gilles Marshall is a 48year old male who presents for a complete physical exam, as well as for follow-up of type 2 diabetes, hypertension and dyslipidemia. HPI:   His last physical was August 2020. He feels well.   No specific issues for discuss Diverticulosis     CT scan 3-17 and colonoscopy 5-17   • Dyslipidemia associated with type 2 diabetes mellitus ([facility] [address]    • Esophageal reflux    • Essential hypertension    • Hx of adenomatous colonic polyps 05/2017    none in 2020.  Repeat CLN in 2025   • Matilde Brand nontender without mass or hepatosplenomegaly  EXTREMITIES: Normal without cyanosis or clubbing, with normal-appearing feet without ulcerations or lesions  PULSES: Carotid upstrokes 2+ without carotid bruits, distal pulses 2+ bilateral dorsalis pedis and po

## 2021-09-28 ENCOUNTER — LAB ENCOUNTER (OUTPATIENT)
Dept: LAB | Facility: HOSPITAL | Age: 51
End: 2021-09-28
Attending: INTERNAL MEDICINE
Payer: COMMERCIAL

## 2021-09-28 DIAGNOSIS — Z00.00 ANNUAL PHYSICAL EXAM: ICD-10-CM

## 2021-09-28 PROCEDURE — 3051F HG A1C>EQUAL 7.0%<8.0%: CPT | Performed by: NURSE PRACTITIONER

## 2021-09-28 PROCEDURE — 36415 COLL VENOUS BLD VENIPUNCTURE: CPT

## 2021-09-28 PROCEDURE — 82043 UR ALBUMIN QUANTITATIVE: CPT

## 2021-09-28 PROCEDURE — 80053 COMPREHEN METABOLIC PANEL: CPT

## 2021-09-28 PROCEDURE — 80061 LIPID PANEL: CPT

## 2021-09-28 PROCEDURE — 82570 ASSAY OF URINE CREATININE: CPT

## 2021-09-28 PROCEDURE — 85027 COMPLETE CBC AUTOMATED: CPT

## 2021-09-28 PROCEDURE — 83036 HEMOGLOBIN GLYCOSYLATED A1C: CPT

## 2021-09-28 PROCEDURE — 3061F NEG MICROALBUMINURIA REV: CPT | Performed by: NURSE PRACTITIONER

## 2021-11-10 DIAGNOSIS — Z00.00 ANNUAL PHYSICAL EXAM: ICD-10-CM

## 2021-11-10 RX ORDER — LISINOPRIL 10 MG/1
TABLET ORAL
Qty: 90 TABLET | Refills: 1 | Status: SHIPPED | OUTPATIENT
Start: 2021-11-10 | End: 2022-05-09

## 2021-11-10 RX ORDER — AMLODIPINE BESYLATE 10 MG/1
TABLET ORAL
Qty: 90 TABLET | Refills: 1 | Status: SHIPPED | OUTPATIENT
Start: 2021-11-10 | End: 2022-05-09

## 2021-11-10 NOTE — TELEPHONE ENCOUNTER
Refill passed per [place] [facility] [place] protocol.       Requested Prescriptions   Pending Prescriptions Disp Refills    LISINOPRIL 10 MG Oral Tab [Pharmacy Med Name: LISINOPRIL 10MG TABLETS] 90 tablet 1     Sig: TAKE 1 TABLET(10 MG) BY MOUTH DAILY        Hypertensive Medications Protocol Passed - 11/10/2021  5:58 AM        Passed - CMP or BMP in past 12 months        Passed - Appointment in past 6 or next 3 months        Passed - GFR  > 50     Lab Results   Component Value Date    [facility] 95 09/28/2021                    AMLODIPINE 10 MG Oral Tab [Pharmacy Med Name: AMLODIPINE BESYLATE 10MG TABLETS] 90 tablet 1     Sig: TAKE 1 TABLET(10 MG) BY MOUTH DAILY        Hypertensive Medications Protocol Passed - 11/10/2021  5:58 AM        Passed - CMP or BMP in past 12 months        Passed - Appointment in past 6 or next 3 months        Passed - GFR  > 50     Lab Results   Component Value Date    [facility] 95 09/28/2021                            Recent Outpatient Visits              1 month ago Annual physical exam    Carson Tovar MD    Office Visit    6 months ago Type 2 diabetes mellitus without complication, without long-term current use of insulin [facility])    [facility] [address] Visit    7 months ago Type 2 diabetes mellitus without complication, without long-term current use of insulin [facility])    Carson Tovar MD    Office Visit    10 months ago Chronic midline back pain, unspecified back location    Sharmaine Shanks MD    Office Visit    1 year ago     [facility] in [place], [place]    Office Visit

## 2021-11-30 ENCOUNTER — TELEPHONE (OUTPATIENT)
Dept: INTERNAL MEDICINE CLINIC | Facility: CLINIC | Age: 51
End: 2021-11-30

## 2021-11-30 NOTE — TELEPHONE ENCOUNTER
Pt calling to confirm when he is due for his 2nd shingles shot dose. Requesting call back   Please advise.

## 2021-12-10 DIAGNOSIS — Z00.00 ANNUAL PHYSICAL EXAM: ICD-10-CM

## 2021-12-17 ENCOUNTER — OFFICE VISIT (OUTPATIENT)
Dept: OTHER | Facility: HOSPITAL | Age: 51
End: 2021-12-17
Attending: EMERGENCY MEDICINE

## 2021-12-17 DIAGNOSIS — Z00.00 ROUTINE GENERAL MEDICAL EXAMINATION AT A HEALTH CARE FACILITY: Primary | ICD-10-CM

## 2021-12-17 PROCEDURE — 86480 TB TEST CELL IMMUN MEASURE: CPT

## 2021-12-17 PROCEDURE — 86706 HEP B SURFACE ANTIBODY: CPT

## 2021-12-23 ENCOUNTER — NURSE TRIAGE (OUTPATIENT)
Dept: INTERNAL MEDICINE CLINIC | Facility: CLINIC | Age: 51
End: 2021-12-23

## 2021-12-23 ENCOUNTER — OFFICE VISIT (OUTPATIENT)
Dept: INTERNAL MEDICINE CLINIC | Facility: CLINIC | Age: 51
End: 2021-12-23

## 2021-12-23 VITALS
DIASTOLIC BLOOD PRESSURE: 96 MMHG | HEIGHT: 69 IN | HEART RATE: 90 BPM | SYSTOLIC BLOOD PRESSURE: 163 MMHG | RESPIRATION RATE: 16 BRPM | BODY MASS INDEX: 28.58 KG/M2 | WEIGHT: 193 LBS

## 2021-12-23 DIAGNOSIS — E11.9 TYPE 2 DIABETES MELLITUS WITHOUT COMPLICATION, WITHOUT LONG-TERM CURRENT USE OF INSULIN (HCC): Primary | ICD-10-CM

## 2021-12-23 PROCEDURE — 3080F DIAST BP >= 90 MM HG: CPT | Performed by: NURSE PRACTITIONER

## 2021-12-23 PROCEDURE — 99213 OFFICE O/P EST LOW 20 MIN: CPT | Performed by: NURSE PRACTITIONER

## 2021-12-23 PROCEDURE — 3008F BODY MASS INDEX DOCD: CPT | Performed by: NURSE PRACTITIONER

## 2021-12-23 PROCEDURE — 3077F SYST BP >= 140 MM HG: CPT | Performed by: NURSE PRACTITIONER

## 2021-12-23 RX ORDER — GLIPIZIDE 5 MG/1
5 TABLET ORAL
Qty: 20 TABLET | Refills: 1 | Status: SHIPPED | OUTPATIENT
Start: 2021-12-23 | End: 2022-01-07 | Stop reason: DRUGHIGH

## 2021-12-23 NOTE — PROGRESS NOTES
Christel Barthel is a 48year old male. Patient presents with:  Diabetes    HPI:   Patient is here due to running out of his diabetic medication. He was taking [place].  unfortunately he does not have insurance and needs a cheaper option un Never Smoker      Smokeless tobacco: Never Used    Vaping Use      Vaping Use: Never used    Alcohol use:  Yes      Alcohol/week: 1.0 standard drink      Types: 1 Glasses of wine per week      Comment: Infrequent wine    Drug use: No     Family History   Pr these issues and agrees to the plan. Return for follow up with PCP .

## 2021-12-23 NOTE — TELEPHONE ENCOUNTER
Royal Shah APRN - Please assist, pt of Dr. Glenroy Duenas. Insurance does not start until 1/1/22 and out of diabetic medication. Dr. Glenroy Duenas - FYI    Patient called office. RN spoke with patient. Patient's date of birth and full name both confirmed. BIENVENIDO Hendrickson       Reason for Disposition  • Patient wants to be seen    Protocols used: DIABETES - HIGH BLOOD SUGAR-A-OH

## 2021-12-25 ENCOUNTER — TELEPHONE (OUTPATIENT)
Dept: INTERNAL MEDICINE CLINIC | Facility: CLINIC | Age: 51
End: 2021-12-25

## 2021-12-25 NOTE — TELEPHONE ENCOUNTER
Spoke with patient this afternoon 12-25 after being paged through the answering service. Recent lapse in medical insurance, resuming again January 1.  [facility] and was hesitant to continue because of cost. Saw a different prov

## 2022-01-07 ENCOUNTER — OFFICE VISIT (OUTPATIENT)
Dept: INTERNAL MEDICINE CLINIC | Facility: CLINIC | Age: 52
End: 2022-01-07
Payer: COMMERCIAL

## 2022-01-07 VITALS
DIASTOLIC BLOOD PRESSURE: 85 MMHG | BODY MASS INDEX: 28.73 KG/M2 | HEART RATE: 77 BPM | WEIGHT: 194 LBS | HEIGHT: 69 IN | SYSTOLIC BLOOD PRESSURE: 138 MMHG

## 2022-01-07 DIAGNOSIS — E11.9 TYPE 2 DIABETES MELLITUS WITHOUT COMPLICATION, WITHOUT LONG-TERM CURRENT USE OF INSULIN (HCC): Primary | ICD-10-CM

## 2022-01-07 PROCEDURE — 3075F SYST BP GE 130 - 139MM HG: CPT | Performed by: INTERNAL MEDICINE

## 2022-01-07 PROCEDURE — 99213 OFFICE O/P EST LOW 20 MIN: CPT | Performed by: INTERNAL MEDICINE

## 2022-01-07 PROCEDURE — 3008F BODY MASS INDEX DOCD: CPT | Performed by: INTERNAL MEDICINE

## 2022-01-07 PROCEDURE — 3079F DIAST BP 80-89 MM HG: CPT | Performed by: INTERNAL MEDICINE

## 2022-01-07 RX ORDER — GLIPIZIDE 10 MG/1
10 TABLET ORAL
Qty: 60 TABLET | Refills: 2 | Status: SHIPPED | OUTPATIENT
Start: 2022-01-07

## 2022-01-07 NOTE — PROGRESS NOTES
Christ Leonard is a 46year old male. Patient presents with:  Medication Follow-Up    HPI:   Te Allison presents this morning to discuss diabetes medication.     Typically he takes [place] daily and diabetes has been well controlled, with g MOUTH EVERY NIGHT 90 tablet 1   • Multiple Vitamins-Minerals ([facility] DAILY MULTIPLE FOR MEN) Oral Tab Take by mouth.        No Known Allergies   Past Medical History:   Diagnosis Date   • Diverticulosis     CT scan 3-17 and colonoscopy 5-17   • Dyslipidemia as AM

## 2022-01-07 NOTE — PATIENT INSTRUCTIONS
Begin Metformin 500 mg twice daily with meals. If unable to tolerate Metformin, then start glipizide 10 mg twice daily with meals. Resume Januvia and Jardiance on March 1. Return visit in March.

## 2022-02-07 DIAGNOSIS — Z00.00 ANNUAL PHYSICAL EXAM: ICD-10-CM

## 2022-02-07 NOTE — TELEPHONE ENCOUNTER
Refill passed per [facility].     Requested Prescriptions   Pending Prescriptions Disp Refills    JARDIANCE 10 MG Oral Tab [Pharmacy Med Name: Juan M Cramer 10MG TABLETS] 90 tablet 1     Sig: TAKE 1 TABLET(10 MG) BY MOUTH DAILY        Diabetes Medication Protocol Passed - 2/7/2022  9:16 AM        Passed - Last A1C < 7.5 and within past 6 months     Lab Results   Component Value Date    A1C 7.1 (H) 09/28/2021               Passed - Appointment in past 6 or next 3 months        Passed - GFR  > 50     Lab Results   Component Value Date    GFRAA 95 09/28/2021                 Passed - GFR in the past 12 months              Recent Outpatient Visits              1 month ago Type 2 diabetes mellitus without complication, without long-term current use of insulin [facility])    [facility], [place] Dawson MD    Office Visit    1 month ago Type 2 diabetes mellitus without complication, without long-term current use of insulin [facility])    [facility], [address], [address] [facility], [facility]    Office Visit    1 month ago Routine general medical examination at a health care facility    [address] [place] [address]. Visit    4 months ago Annual physical exam    [facility], [address], Sal[place] Dawson MD    Office Visit    9 months ago Type 2 diabetes mellitus without complication, without long-term current use of insulin [facility])    [facility] [address], [place] Louie    Office Visit

## 2022-03-22 ENCOUNTER — TELEPHONE (OUTPATIENT)
Dept: INTERNAL MEDICINE CLINIC | Facility: CLINIC | Age: 52
End: 2022-03-22

## 2022-03-22 NOTE — TELEPHONE ENCOUNTER
Patient had shingles vaccine 9/22/21. Patient called today, exactly 6 months after vaccine given and is now worried there will be a negative effect. Patient advised, per CDC guidelines, if more If more than 6 months have elapsed since the first dose, the second dose should be administered as soon as possible. Patient started talking loudly, \"I am asking for 's thoughts on if being late in taking shingles vaccine, if he will have any negative side effects\" Please advise further.

## 2022-03-23 ENCOUNTER — NURSE ONLY (OUTPATIENT)
Dept: INTERNAL MEDICINE CLINIC | Facility: CLINIC | Age: 52
End: 2022-03-23
Payer: COMMERCIAL

## 2022-03-23 DIAGNOSIS — Z23 IMMUNIZATION DUE: Primary | ICD-10-CM

## 2022-03-23 PROCEDURE — 90471 IMMUNIZATION ADMIN: CPT | Performed by: INTERNAL MEDICINE

## 2022-03-23 PROCEDURE — 90750 HZV VACC RECOMBINANT IM: CPT | Performed by: INTERNAL MEDICINE

## 2022-05-09 RX ORDER — AMLODIPINE BESYLATE 10 MG/1
TABLET ORAL
Qty: 90 TABLET | Refills: 1 | Status: SHIPPED | OUTPATIENT
Start: 2022-05-09

## 2022-05-09 RX ORDER — LISINOPRIL 10 MG/1
TABLET ORAL
Qty: 90 TABLET | Refills: 1 | Status: SHIPPED | OUTPATIENT
Start: 2022-05-09

## 2022-05-09 NOTE — TELEPHONE ENCOUNTER
Refill passed per [facility] protocol.   Requested Prescriptions   Pending Prescriptions Disp Refills    LISINOPRIL 10 MG Oral Tab [Pharmacy Med Name: LISINOPRIL 10MG TABLETS] 90 tablet 1     Sig: TAKE 1 TABLET(10 MG) BY MOUTH DAILY        Hypertensive Medications Protocol Passed - 5/9/2022  6:00 AM        Passed - CMP or BMP in past 12 months        Passed - Appointment in past 6 or next 3 months        Passed - GFR  > 50     Lab Results   Component Value Date    [facility] 09/28/2021                    AMLODIPINE 10 MG Oral Tab [Pharmacy Med Name: AMLODIPINE BESYLATE 10MG TABLETS] 90 tablet 1     Sig: TAKE 1 TABLET(10 MG) BY MOUTH DAILY        Hypertensive Medications Protocol Passed - 5/9/2022  6:00 AM        Passed - CMP or BMP in past 12 months        Passed - Appointment in past 6 or next 3 months        Passed - GFR  > 50     Lab Results   Component Value Date    [facility] 95 09/28/2021                     Recent Outpatient Visits              1 month ago Immunization due    [facility], [place], [place], [facility]    Nurse Only    4 months ago Type 2 diabetes mellitus without complication, without long-term current use of insulin ([facility]    [address] [facility], Adeline Alston MD    Office Visit    4 months ago Type 2 diabetes mellitus without complication, without long-term current use of insulin [facility])    [place] [facility] [place], [address] [place] [address], [address] [facility], [facility]    Office Visit    4 months ago Routine general medical examination at a health care facility    [facility]    7 months ago Annual physical exam    [facility], [place], [place], Adeline Alston MD    Office Visit

## 2022-06-01 ENCOUNTER — TELEPHONE (OUTPATIENT)
Dept: INTERNAL MEDICINE CLINIC | Facility: CLINIC | Age: 52
End: 2022-06-01

## 2022-06-01 DIAGNOSIS — E11.9 DIABETIC EYE EXAM (HCC): Primary | ICD-10-CM

## 2022-06-01 DIAGNOSIS — Z01.00 DIABETIC EYE EXAM (HCC): Primary | ICD-10-CM

## 2022-06-01 NOTE — TELEPHONE ENCOUNTER
Patient is requesting a referral for Dr. Sela Duverney.  Patient has an appointment scheduled for 10/6 for his yearly diabetic eye exam

## 2022-06-07 NOTE — TELEPHONE ENCOUNTER
Good Afternoon Dr Ant Gan and staff,    Please review pended referral and add DX codes for EE with Dr Umang Cruz.     THank you for your help    [facility]

## 2022-06-12 DIAGNOSIS — Z00.00 ANNUAL PHYSICAL EXAM: ICD-10-CM

## 2022-06-13 ENCOUNTER — OFFICE VISIT (OUTPATIENT)
Dept: INTERNAL MEDICINE CLINIC | Facility: CLINIC | Age: 52
End: 2022-06-13
Payer: COMMERCIAL

## 2022-06-13 VITALS
BODY MASS INDEX: 28.54 KG/M2 | HEART RATE: 72 BPM | DIASTOLIC BLOOD PRESSURE: 74 MMHG | WEIGHT: 192.69 LBS | HEIGHT: 69 IN | SYSTOLIC BLOOD PRESSURE: 124 MMHG

## 2022-06-13 DIAGNOSIS — Z00.00 ANNUAL PHYSICAL EXAM: ICD-10-CM

## 2022-06-13 DIAGNOSIS — I10 ESSENTIAL HYPERTENSION: ICD-10-CM

## 2022-06-13 DIAGNOSIS — E11.9 TYPE 2 DIABETES MELLITUS WITHOUT COMPLICATION, WITHOUT LONG-TERM CURRENT USE OF INSULIN (HCC): Primary | ICD-10-CM

## 2022-06-13 PROCEDURE — 99214 OFFICE O/P EST MOD 30 MIN: CPT | Performed by: INTERNAL MEDICINE

## 2022-06-13 PROCEDURE — 3078F DIAST BP <80 MM HG: CPT | Performed by: INTERNAL MEDICINE

## 2022-06-13 PROCEDURE — 3008F BODY MASS INDEX DOCD: CPT | Performed by: INTERNAL MEDICINE

## 2022-06-13 PROCEDURE — 3074F SYST BP LT 130 MM HG: CPT | Performed by: INTERNAL MEDICINE

## 2022-06-13 RX ORDER — EMPAGLIFLOZIN 10 MG/1
TABLET, FILM COATED ORAL
Qty: 90 TABLET | Refills: 1 | Status: SHIPPED | OUTPATIENT
Start: 2022-06-13

## 2022-06-13 NOTE — TELEPHONE ENCOUNTER
Please review. Protocol failed / No protocol. Requested Prescriptions   Pending Prescriptions Disp Refills    SITagliptin Phosphate (JANUVIA) 100 MG Oral Tab 90 tablet 1     Sig: TAKE 1 TABLET(100 MG) BY MOUTH DAILY        Diabetes Medication Protocol Failed - 6/13/2022 11:45 AM        Failed - Last A1C < 7.5 and within past 6 months     Lab Results   Component Value Date    A1C 7.1 (H) 09/28/2021               Passed - Appointment in past 6 or next 3 months        Passed - GFR  > 50     Lab Results   Component Value Date    GFRAA 95 09/28/2021                 Passed - GFR in the past 12 months           empagliflozin (JARDIANCE) 10 MG Oral Tab 90 tablet 1     Sig: Take 1 tablet (10 mg total) by mouth daily.         Diabetes Medication Protocol Failed - 6/13/2022 11:45 AM        Failed - Last A1C < 7.5 and within past 6 months     Lab Results   Component Value Date    A1C 7.1 (H) 09/28/2021               Passed - Appointment in past 6 or next 3 months        Passed - GFR  > 50     Lab Results   Component Value Date    GFRAA 95 09/28/2021                 Passed - GFR in the past 12 months              Future Appointments         Provider Department Appt Notes    In 3 months Pari Murphy MD [facility] dm ee ep-made aware referral needed            Recent Outpatient Visits              2 months ago [facility]ization due    [facility], [address], [facility]    Nurse Only    5 months ago Type 2 diabetes mellitus without complication, without long-term current use of insulin [facility])    [facility], [address], Amalia Vogel MD    Office Visit    5 months ago Type 2 diabetes mellitus without complication, without long-term current use of insulin [facility])    [facility], [address], [place], [place]    Office Visit    5 months ago Routine general medical examination at a health care facility    [facility]    Office Visit    8 months ago Annual physical exam    [facility], [facility], [address], Carole Brandt MD    Office Visit

## 2022-06-13 NOTE — PATIENT INSTRUCTIONS
Your blood pressure today was excellent at 124/74. Please come in soon for a glycohemoglobin level. Continue current medications. Continue healthy diet and regular walking. Please schedule a physical in September.

## 2022-06-13 NOTE — TELEPHONE ENCOUNTER
Patient is calling asking if his medications were sent to his pharmacy. Advised of notes below and he is angry, asking me what is the meaning for the office visit. . I advised that provider's don't always explain why they would like to see their patient's,  but that when we order medications we have a protocol we follow that has specific requirements including offices visits within a specific span of time and lab work done. He advised he will come into the office as scheduled this evening.

## 2022-06-13 NOTE — TELEPHONE ENCOUNTER
Patient requesting refill on the following medication. Patient requesting high priority and would like prescription filled today 6/13. Please call patient when it has gone to the pharmacy. Patient is out of medication.      empagliflozin (JARDIANCE) 10 MG Oral Tab    Elizabeth Glass

## 2022-06-13 NOTE — TELEPHONE ENCOUNTER
1st attempt - [employer] message sent for patient to contact the office to schedule an appointment; see notes below.

## 2022-06-13 NOTE — TELEPHONE ENCOUNTER
Pt is calling for status of his medication refill request. Pt can be reached at [phone]. Pt is requesting a 90 day supply for both  Medications.

## 2022-06-14 ENCOUNTER — LAB ENCOUNTER (OUTPATIENT)
Dept: LAB | Facility: HOSPITAL | Age: 52
End: 2022-06-14
Attending: INTERNAL MEDICINE
Payer: COMMERCIAL

## 2022-06-14 DIAGNOSIS — E11.9 TYPE 2 DIABETES MELLITUS WITHOUT COMPLICATION, WITHOUT LONG-TERM CURRENT USE OF INSULIN (HCC): ICD-10-CM

## 2022-06-14 LAB
EST. AVERAGE GLUCOSE BLD GHB EST-MCNC: 174 MG/DL (ref 68–126)
HBA1C MFR BLD: 7.7 % (ref ?–5.7)

## 2022-06-14 PROCEDURE — 36415 COLL VENOUS BLD VENIPUNCTURE: CPT

## 2022-06-14 PROCEDURE — 3051F HG A1C>EQUAL 7.0%<8.0%: CPT | Performed by: INTERNAL MEDICINE

## 2022-06-14 PROCEDURE — 83036 HEMOGLOBIN GLYCOSYLATED A1C: CPT

## 2022-06-30 ENCOUNTER — TELEPHONE (OUTPATIENT)
Dept: INTERNAL MEDICINE CLINIC | Facility: CLINIC | Age: 52
End: 2022-06-30

## 2022-06-30 NOTE — TELEPHONE ENCOUNTER
Patient contacts clinic because he is changing jobs and insurance won't be effective for 90 days beginning next month. He is concerned that he will not be able to get refills when insurance terminates. Chart reviewed, patient has refills on file for 6 months on all his maintenance medications. He should follow up with his plan about obtaining an early refill or a COBRA plan that would cover him until his new policy kicks in. He will do so.

## 2022-07-19 ENCOUNTER — TELEPHONE (OUTPATIENT)
Dept: INTERNAL MEDICINE CLINIC | Facility: CLINIC | Age: 52
End: 2022-07-19

## 2022-07-19 DIAGNOSIS — Z00.00 ANNUAL PHYSICAL EXAM: ICD-10-CM

## 2022-07-19 NOTE — TELEPHONE ENCOUNTER
Per patient he's in the middle in moving and he misplace where he pack his rx med [facility] and  his refill is not until 08/30/2022. Patient would like to know if Dr Raf Metzger can approve his rx med release it earlier. due to he's out of medication.

## 2022-07-20 NOTE — TELEPHONE ENCOUNTER
I have sent a prescription refill for his Jardiance to his pharmacy. However, that does not guarantee at all that his insurance will pay for it. He will need to take this issue up with his insurance company.

## 2022-10-05 ENCOUNTER — OFFICE VISIT (OUTPATIENT)
Dept: INTERNAL MEDICINE CLINIC | Facility: CLINIC | Age: 52
End: 2022-10-05
Payer: COMMERCIAL

## 2022-10-05 ENCOUNTER — LAB ENCOUNTER (OUTPATIENT)
Dept: LAB | Age: 52
End: 2022-10-05
Attending: INTERNAL MEDICINE
Payer: COMMERCIAL

## 2022-10-05 VITALS
HEIGHT: 69 IN | BODY MASS INDEX: 28.56 KG/M2 | WEIGHT: 192.81 LBS | HEART RATE: 72 BPM | SYSTOLIC BLOOD PRESSURE: 122 MMHG | DIASTOLIC BLOOD PRESSURE: 74 MMHG

## 2022-10-05 DIAGNOSIS — Z00.00 ANNUAL PHYSICAL EXAM: ICD-10-CM

## 2022-10-05 DIAGNOSIS — D12.6 ADENOMATOUS POLYP OF COLON, UNSPECIFIED PART OF COLON: ICD-10-CM

## 2022-10-05 DIAGNOSIS — Z00.00 ANNUAL PHYSICAL EXAM: Primary | ICD-10-CM

## 2022-10-05 DIAGNOSIS — I10 ESSENTIAL HYPERTENSION: ICD-10-CM

## 2022-10-05 DIAGNOSIS — E78.5 DYSLIPIDEMIA ASSOCIATED WITH TYPE 2 DIABETES MELLITUS (HCC): ICD-10-CM

## 2022-10-05 DIAGNOSIS — E11.69 DYSLIPIDEMIA ASSOCIATED WITH TYPE 2 DIABETES MELLITUS (HCC): ICD-10-CM

## 2022-10-05 DIAGNOSIS — E11.9 TYPE 2 DIABETES MELLITUS WITHOUT COMPLICATION, WITHOUT LONG-TERM CURRENT USE OF INSULIN (HCC): ICD-10-CM

## 2022-10-05 LAB
ALBUMIN SERPL-MCNC: 4.1 G/DL (ref 3.4–5)
ALBUMIN/GLOB SERPL: 1.2 {RATIO} (ref 1–2)
ALP LIVER SERPL-CCNC: 78 U/L
ALT SERPL-CCNC: 35 U/L
ANION GAP SERPL CALC-SCNC: 7 MMOL/L (ref 0–18)
AST SERPL-CCNC: 20 U/L (ref 15–37)
BILIRUB SERPL-MCNC: 0.3 MG/DL (ref 0.1–2)
BUN BLD-MCNC: 13 MG/DL (ref 7–18)
BUN/CREAT SERPL: 10.5 (ref 10–20)
CALCIUM BLD-MCNC: 9.5 MG/DL (ref 8.5–10.1)
CHLORIDE SERPL-SCNC: 110 MMOL/L (ref 98–112)
CHOLEST SERPL-MCNC: 214 MG/DL (ref ?–200)
CO2 SERPL-SCNC: 24 MMOL/L (ref 21–32)
COMPLEXED PSA SERPL-MCNC: 0.57 NG/ML (ref ?–4)
CREAT BLD-MCNC: 1.24 MG/DL
CREAT UR-SCNC: 128 MG/DL
DEPRECATED RDW RBC AUTO: 44.3 FL (ref 35.1–46.3)
ERYTHROCYTE [DISTWIDTH] IN BLOOD BY AUTOMATED COUNT: 14.6 % (ref 11–15)
EST. AVERAGE GLUCOSE BLD GHB EST-MCNC: 160 MG/DL (ref 68–126)
FASTING PATIENT LIPID ANSWER: YES
FASTING STATUS PATIENT QL REPORTED: YES
GFR SERPLBLD BASED ON 1.73 SQ M-ARVRAT: 70 ML/MIN/1.73M2 (ref 60–?)
GLOBULIN PLAS-MCNC: 3.4 G/DL (ref 2.8–4.4)
GLUCOSE BLD-MCNC: 136 MG/DL (ref 70–99)
HBA1C MFR BLD: 7.2 % (ref ?–5.7)
HCT VFR BLD AUTO: 49.1 %
HDLC SERPL-MCNC: 38 MG/DL (ref 40–59)
HGB BLD-MCNC: 15.8 G/DL
LDLC SERPL CALC-MCNC: 122 MG/DL (ref ?–100)
MCH RBC QN AUTO: 27.1 PG (ref 26–34)
MCHC RBC AUTO-ENTMCNC: 32.2 G/DL (ref 31–37)
MCV RBC AUTO: 84.4 FL
MICROALBUMIN UR-MCNC: 0.59 MG/DL
MICROALBUMIN/CREAT 24H UR-RTO: 4.6 UG/MG (ref ?–30)
NONHDLC SERPL-MCNC: 176 MG/DL (ref ?–130)
OSMOLALITY SERPL CALC.SUM OF ELEC: 294 MOSM/KG (ref 275–295)
PLATELET # BLD AUTO: 281 10(3)UL (ref 150–450)
POTASSIUM SERPL-SCNC: 3.7 MMOL/L (ref 3.5–5.1)
PROT SERPL-MCNC: 7.5 G/DL (ref 6.4–8.2)
RBC # BLD AUTO: 5.82 X10(6)UL
SODIUM SERPL-SCNC: 141 MMOL/L (ref 136–145)
TRIGL SERPL-MCNC: 308 MG/DL (ref 30–149)
VLDLC SERPL CALC-MCNC: 55 MG/DL (ref 0–30)
WBC # BLD AUTO: 6.6 X10(3) UL (ref 4–11)

## 2022-10-05 PROCEDURE — 36415 COLL VENOUS BLD VENIPUNCTURE: CPT

## 2022-10-05 PROCEDURE — 82570 ASSAY OF URINE CREATININE: CPT

## 2022-10-05 PROCEDURE — 80053 COMPREHEN METABOLIC PANEL: CPT

## 2022-10-05 PROCEDURE — 3051F HG A1C>EQUAL 7.0%<8.0%: CPT | Performed by: INTERNAL MEDICINE

## 2022-10-05 PROCEDURE — 85027 COMPLETE CBC AUTOMATED: CPT

## 2022-10-05 PROCEDURE — 80061 LIPID PANEL: CPT

## 2022-10-05 PROCEDURE — 90677 PCV20 VACCINE IM: CPT | Performed by: INTERNAL MEDICINE

## 2022-10-05 PROCEDURE — 90472 IMMUNIZATION ADMIN EACH ADD: CPT | Performed by: INTERNAL MEDICINE

## 2022-10-05 PROCEDURE — 99396 PREV VISIT EST AGE 40-64: CPT | Performed by: INTERNAL MEDICINE

## 2022-10-05 PROCEDURE — 82043 UR ALBUMIN QUANTITATIVE: CPT

## 2022-10-05 PROCEDURE — 3008F BODY MASS INDEX DOCD: CPT | Performed by: INTERNAL MEDICINE

## 2022-10-05 PROCEDURE — 3074F SYST BP LT 130 MM HG: CPT | Performed by: INTERNAL MEDICINE

## 2022-10-05 PROCEDURE — 3078F DIAST BP <80 MM HG: CPT | Performed by: INTERNAL MEDICINE

## 2022-10-05 PROCEDURE — 90686 IIV4 VACC NO PRSV 0.5 ML IM: CPT | Performed by: INTERNAL MEDICINE

## 2022-10-05 PROCEDURE — 90471 IMMUNIZATION ADMIN: CPT | Performed by: INTERNAL MEDICINE

## 2022-10-05 PROCEDURE — 83036 HEMOGLOBIN GLYCOSYLATED A1C: CPT

## 2022-10-05 RX ORDER — AMLODIPINE BESYLATE 10 MG/1
10 TABLET ORAL DAILY
Qty: 90 TABLET | Refills: 1 | Status: SHIPPED | OUTPATIENT
Start: 2022-10-05

## 2022-10-05 RX ORDER — LISINOPRIL 10 MG/1
10 TABLET ORAL DAILY
Qty: 90 TABLET | Refills: 1 | Status: SHIPPED | OUTPATIENT
Start: 2022-10-05

## 2022-10-05 NOTE — PATIENT INSTRUCTIONS
Your blood pressure today was excellent at 122/74 and your exam was normal.  You received a flu shot and Prevnar 20 vaccine today. Please get a COVID booster soon. Await results of today's blood and urine testing. Continue current medications. Continue healthy diet and regular exercise. Return visit in 6 months.

## 2022-10-06 ENCOUNTER — OFFICE VISIT (OUTPATIENT)
Dept: OPHTHALMOLOGY | Facility: CLINIC | Age: 52
End: 2022-10-06
Payer: COMMERCIAL

## 2022-10-06 DIAGNOSIS — E11.9 TYPE 2 DIABETES MELLITUS WITHOUT RETINOPATHY (HCC): ICD-10-CM

## 2022-10-06 DIAGNOSIS — H25.13 AGE-RELATED NUCLEAR CATARACT OF BOTH EYES: Primary | ICD-10-CM

## 2022-10-06 PROCEDURE — 92014 COMPRE OPH EXAM EST PT 1/>: CPT | Performed by: OPHTHALMOLOGY

## 2022-10-06 PROCEDURE — 2023F DILAT RTA XM W/O RTNOPTHY: CPT | Performed by: OPHTHALMOLOGY

## 2022-10-06 NOTE — PATIENT INSTRUCTIONS
Type 2 diabetes mellitus without retinopathy ([facility]  Diabetes type II: no background of retinopathy, no signs of neovascularization noted. Discussed ocular and systemic benefits of blood sugar control. Diagnosis and treatment discussed in detail with patient. Will see patient in 1 year for a diabetic exam    Age-related nuclear cataract of both eyes  Discussed early cataracts with patient. No treatment recommended at this time.

## 2022-10-24 ENCOUNTER — NURSE TRIAGE (OUTPATIENT)
Dept: INTERNAL MEDICINE CLINIC | Facility: CLINIC | Age: 52
End: 2022-10-24

## 2022-11-05 ENCOUNTER — OFFICE VISIT (OUTPATIENT)
Dept: INTERNAL MEDICINE CLINIC | Facility: CLINIC | Age: 52
End: 2022-11-05
Payer: COMMERCIAL

## 2022-11-05 VITALS
DIASTOLIC BLOOD PRESSURE: 82 MMHG | BODY MASS INDEX: 29.12 KG/M2 | HEART RATE: 70 BPM | HEIGHT: 69 IN | SYSTOLIC BLOOD PRESSURE: 125 MMHG | WEIGHT: 196.63 LBS

## 2022-11-05 DIAGNOSIS — M25.512 LEFT SHOULDER PAIN, UNSPECIFIED CHRONICITY: Primary | ICD-10-CM

## 2022-11-05 PROCEDURE — 99213 OFFICE O/P EST LOW 20 MIN: CPT | Performed by: INTERNAL MEDICINE

## 2022-11-05 PROCEDURE — 3008F BODY MASS INDEX DOCD: CPT | Performed by: INTERNAL MEDICINE

## 2022-11-05 PROCEDURE — 3074F SYST BP LT 130 MM HG: CPT | Performed by: INTERNAL MEDICINE

## 2022-11-05 PROCEDURE — 3079F DIAST BP 80-89 MM HG: CPT | Performed by: INTERNAL MEDICINE

## 2022-11-05 RX ORDER — NAPROXEN 500 MG/1
500 TABLET ORAL 2 TIMES DAILY WITH MEALS
Qty: 30 TABLET | Refills: 0 | Status: SHIPPED | OUTPATIENT
Start: 2022-11-05 | End: 2023-10-31

## 2022-11-05 NOTE — PATIENT INSTRUCTIONS
Please avoid painful activity and apply heat to your left shoulder for 10-15 minutes 2-3 times daily. Take naproxen 500 mg twice daily with meals. Call if not soon better. We can then consider either physical therapy or evaluation with Orthopedics. Evaluation with Orthopedics.

## 2023-01-04 ENCOUNTER — OFFICE VISIT (OUTPATIENT)
Dept: INTERNAL MEDICINE | Age: 53
End: 2023-01-04

## 2023-01-04 VITALS
BODY MASS INDEX: 29.33 KG/M2 | DIASTOLIC BLOOD PRESSURE: 82 MMHG | RESPIRATION RATE: 19 BRPM | SYSTOLIC BLOOD PRESSURE: 127 MMHG | OXYGEN SATURATION: 99 % | HEIGHT: 69 IN | WEIGHT: 198 LBS | TEMPERATURE: 98.1 F | HEART RATE: 89 BPM

## 2023-01-04 DIAGNOSIS — I15.2 HYPERTENSION COMPLICATING DIABETES (CMD): Primary | ICD-10-CM

## 2023-01-04 DIAGNOSIS — E11.59 HYPERTENSION COMPLICATING DIABETES (CMD): ICD-10-CM

## 2023-01-04 DIAGNOSIS — E11.59 HYPERTENSION COMPLICATING DIABETES (CMD): Primary | ICD-10-CM

## 2023-01-04 DIAGNOSIS — E11.9 TYPE 2 DIABETES MELLITUS WITHOUT RETINOPATHY (CMD): ICD-10-CM

## 2023-01-04 DIAGNOSIS — E78.5 TYPE 2 DIABETES MELLITUS WITH HYPERLIPIDEMIA (CMD): ICD-10-CM

## 2023-01-04 DIAGNOSIS — Z86.010 HISTORY OF COLONIC POLYPS: ICD-10-CM

## 2023-01-04 DIAGNOSIS — I15.2 HYPERTENSION COMPLICATING DIABETES (CMD): ICD-10-CM

## 2023-01-04 DIAGNOSIS — E11.69 TYPE 2 DIABETES MELLITUS WITH HYPERLIPIDEMIA (CMD): ICD-10-CM

## 2023-01-04 PROBLEM — I10 ESSENTIAL HYPERTENSION: Status: ACTIVE | Noted: 2017-03-29

## 2023-01-04 PROBLEM — K63.5 COLON POLYPS: Status: ACTIVE | Noted: 2017-05-17

## 2023-01-04 PROBLEM — K57.90 DIVERTICULOSIS: Status: ACTIVE | Noted: 2017-03-29

## 2023-01-04 LAB
ALBUMIN SERPL-MCNC: 30 G/DL
ALBUMIN/CREAT UR-RTO: 30 MG/G
CREAT UR-MCNC: 50 MG/DL
HBA1C MFR BLD: 7.2 % (ref 4.5–5.6)

## 2023-01-04 PROCEDURE — 99204 OFFICE O/P NEW MOD 45 MIN: CPT | Performed by: INTERNAL MEDICINE

## 2023-01-04 PROCEDURE — 83036 HEMOGLOBIN GLYCOSYLATED A1C: CPT | Performed by: INTERNAL MEDICINE

## 2023-01-04 PROCEDURE — 3079F DIAST BP 80-89 MM HG: CPT | Performed by: INTERNAL MEDICINE

## 2023-01-04 PROCEDURE — 82043 UR ALBUMIN QUANTITATIVE: CPT | Performed by: INTERNAL MEDICINE

## 2023-01-04 PROCEDURE — 3074F SYST BP LT 130 MM HG: CPT | Performed by: INTERNAL MEDICINE

## 2023-01-04 RX ORDER — EMPAGLIFLOZIN 10 MG/1
TABLET, FILM COATED ORAL
COMMUNITY
Start: 2022-12-20 | End: 2023-01-04 | Stop reason: SDUPTHER

## 2023-01-04 RX ORDER — LISINOPRIL 10 MG/1
10 TABLET ORAL DAILY
Qty: 90 TABLET | Refills: 1 | Status: SHIPPED | OUTPATIENT
Start: 2023-01-04 | End: 2023-05-15

## 2023-01-04 RX ORDER — AMLODIPINE BESYLATE 10 MG/1
10 TABLET ORAL
COMMUNITY
Start: 2022-10-05 | End: 2023-01-04 | Stop reason: SDUPTHER

## 2023-01-04 RX ORDER — SITAGLIPTIN 100 MG/1
100 TABLET, FILM COATED ORAL DAILY
Qty: 90 TABLET | Refills: 1 | Status: SHIPPED | OUTPATIENT
Start: 2023-01-04 | End: 2023-05-18

## 2023-01-04 RX ORDER — AMLODIPINE BESYLATE 10 MG/1
10 TABLET ORAL DAILY
Qty: 90 TABLET | Refills: 1 | Status: SHIPPED | OUTPATIENT
Start: 2023-01-04 | End: 2023-05-15

## 2023-01-04 RX ORDER — SITAGLIPTIN 100 MG/1
TABLET, FILM COATED ORAL
COMMUNITY
Start: 2022-12-15 | End: 2023-01-04 | Stop reason: SDUPTHER

## 2023-01-04 RX ORDER — EMPAGLIFLOZIN 10 MG/1
10 TABLET, FILM COATED ORAL
Qty: 90 TABLET | Refills: 1 | Status: SHIPPED | OUTPATIENT
Start: 2023-01-04 | End: 2023-05-18

## 2023-01-04 RX ORDER — LISINOPRIL 10 MG/1
10 TABLET ORAL
COMMUNITY
Start: 2022-10-05 | End: 2023-01-04 | Stop reason: SDUPTHER

## 2023-01-04 ASSESSMENT — ENCOUNTER SYMPTOMS
CONSTIPATION: 0
BACK PAIN: 0
RHINORRHEA: 0
TROUBLE SWALLOWING: 0
POLYDIPSIA: 0
FATIGUE: 0
SHORTNESS OF BREATH: 0
DIARRHEA: 0
DIZZINESS: 0
VOMITING: 0
CHILLS: 0
SLEEP DISTURBANCE: 0
ABDOMINAL PAIN: 0
COUGH: 0
APPETITE CHANGE: 0
BLOOD IN STOOL: 0
WHEEZING: 0
SORE THROAT: 0
BRUISES/BLEEDS EASILY: 0
NERVOUS/ANXIOUS: 0
HEADACHES: 0
WOUND: 0
FEVER: 0
NAUSEA: 0

## 2023-01-04 ASSESSMENT — PATIENT HEALTH QUESTIONNAIRE - PHQ9
SUM OF ALL RESPONSES TO PHQ9 QUESTIONS 1 AND 2: 0
SUM OF ALL RESPONSES TO PHQ9 QUESTIONS 1 AND 2: 0
1. LITTLE INTEREST OR PLEASURE IN DOING THINGS: NOT AT ALL
1. LITTLE INTEREST OR PLEASURE IN DOING THINGS: NOT AT ALL
2. FEELING DOWN, DEPRESSED OR HOPELESS: NOT AT ALL
SUM OF ALL RESPONSES TO PHQ9 QUESTIONS 1 AND 2: 0
CLINICAL INTERPRETATION OF PHQ2 SCORE: NO FURTHER SCREENING NEEDED
SUM OF ALL RESPONSES TO PHQ9 QUESTIONS 1 AND 2: 0
2. FEELING DOWN, DEPRESSED OR HOPELESS: NOT AT ALL
CLINICAL INTERPRETATION OF PHQ2 SCORE: NO FURTHER SCREENING NEEDED

## 2023-05-14 DIAGNOSIS — E11.59 HYPERTENSION COMPLICATING DIABETES (CMD): ICD-10-CM

## 2023-05-14 DIAGNOSIS — I15.2 HYPERTENSION COMPLICATING DIABETES (CMD): ICD-10-CM

## 2023-05-15 RX ORDER — AMLODIPINE BESYLATE 10 MG/1
TABLET ORAL
Qty: 90 TABLET | Refills: 1 | Status: SHIPPED | OUTPATIENT
Start: 2023-05-15

## 2023-05-15 RX ORDER — LISINOPRIL 10 MG/1
TABLET ORAL
Qty: 90 TABLET | Refills: 1 | Status: SHIPPED | OUTPATIENT
Start: 2023-05-15

## 2023-05-18 DIAGNOSIS — E11.9 TYPE 2 DIABETES MELLITUS WITHOUT RETINOPATHY (CMD): ICD-10-CM

## 2023-05-18 RX ORDER — EMPAGLIFLOZIN 10 MG/1
TABLET, FILM COATED ORAL
Qty: 90 TABLET | Refills: 1 | Status: SHIPPED | OUTPATIENT
Start: 2023-05-18

## 2023-05-18 RX ORDER — SITAGLIPTIN 100 MG/1
TABLET, FILM COATED ORAL
Qty: 90 TABLET | Refills: 1 | Status: SHIPPED | OUTPATIENT
Start: 2023-05-18

## 2023-05-25 ENCOUNTER — TELEPHONE (OUTPATIENT)
Dept: INTERNAL MEDICINE | Age: 53
End: 2023-05-25

## 2023-08-05 ENCOUNTER — OFFICE VISIT (OUTPATIENT)
Dept: INTERNAL MEDICINE CLINIC | Facility: CLINIC | Age: 53
End: 2023-08-05

## 2023-08-05 VITALS
OXYGEN SATURATION: 97 % | WEIGHT: 189.19 LBS | BODY MASS INDEX: 28.02 KG/M2 | DIASTOLIC BLOOD PRESSURE: 76 MMHG | HEART RATE: 80 BPM | SYSTOLIC BLOOD PRESSURE: 124 MMHG | TEMPERATURE: 98 F | HEIGHT: 69 IN

## 2023-08-05 DIAGNOSIS — I10 ESSENTIAL HYPERTENSION: ICD-10-CM

## 2023-08-05 DIAGNOSIS — M25.512 LEFT SHOULDER PAIN, UNSPECIFIED CHRONICITY: Primary | ICD-10-CM

## 2023-08-05 DIAGNOSIS — E11.9 TYPE 2 DIABETES MELLITUS WITHOUT COMPLICATION, WITHOUT LONG-TERM CURRENT USE OF INSULIN (HCC): ICD-10-CM

## 2023-08-05 PROCEDURE — 99214 OFFICE O/P EST MOD 30 MIN: CPT | Performed by: INTERNAL MEDICINE

## 2023-08-05 NOTE — PATIENT INSTRUCTIONS
Please schedule physical therapy for your shoulder pain. Await results of glycohemoglobin test.  Continue current medication. Please schedule a physical in October or November.

## 2023-08-08 ENCOUNTER — TELEPHONE (OUTPATIENT)
Dept: PHYSICAL THERAPY | Facility: HOSPITAL | Age: 53
End: 2023-08-08

## 2023-08-15 ENCOUNTER — PATIENT MESSAGE (OUTPATIENT)
Dept: INTERNAL MEDICINE CLINIC | Facility: CLINIC | Age: 53
End: 2023-08-15

## 2023-08-15 ENCOUNTER — LAB ENCOUNTER (OUTPATIENT)
Dept: LAB | Facility: HOSPITAL | Age: 53
End: 2023-08-15
Attending: INTERNAL MEDICINE
Payer: COMMERCIAL

## 2023-08-15 DIAGNOSIS — E11.9 TYPE 2 DIABETES MELLITUS WITHOUT COMPLICATION, WITHOUT LONG-TERM CURRENT USE OF INSULIN (HCC): ICD-10-CM

## 2023-08-15 LAB
EST. AVERAGE GLUCOSE BLD GHB EST-MCNC: 166 MG/DL (ref 68–126)
HBA1C MFR BLD: 7.4 % (ref ?–5.7)

## 2023-08-15 PROCEDURE — 83036 HEMOGLOBIN GLYCOSYLATED A1C: CPT

## 2023-08-15 PROCEDURE — 36415 COLL VENOUS BLD VENIPUNCTURE: CPT

## 2023-08-15 PROCEDURE — 3051F HG A1C>EQUAL 7.0%<8.0%: CPT | Performed by: INTERNAL MEDICINE

## 2023-08-15 NOTE — TELEPHONE ENCOUNTER
From: Billy Roberts  To:  Arlen Morrow MD  Sent: 8/15/2023 12:02 PM CDT  Subject: Question regarding HEMOGLOBIN A1C    Hi please do I still need to work on my A1c or where exactly do I need to be ,

## 2023-09-11 ENCOUNTER — TELEPHONE (OUTPATIENT)
Dept: PHYSICAL THERAPY | Facility: HOSPITAL | Age: 53
End: 2023-09-11

## 2023-09-11 ENCOUNTER — APPOINTMENT (OUTPATIENT)
Dept: PHYSICAL THERAPY | Facility: HOSPITAL | Age: 53
End: 2023-09-11
Attending: INTERNAL MEDICINE
Payer: COMMERCIAL

## 2023-09-13 ENCOUNTER — APPOINTMENT (OUTPATIENT)
Dept: PHYSICAL THERAPY | Facility: HOSPITAL | Age: 53
End: 2023-09-13
Attending: INTERNAL MEDICINE
Payer: COMMERCIAL

## 2023-09-18 ENCOUNTER — TELEPHONE (OUTPATIENT)
Facility: CLINIC | Age: 53
End: 2023-09-18

## 2023-09-22 ENCOUNTER — APPOINTMENT (OUTPATIENT)
Dept: PHYSICAL THERAPY | Facility: HOSPITAL | Age: 53
End: 2023-09-22
Attending: INTERNAL MEDICINE
Payer: COMMERCIAL

## 2023-09-25 ENCOUNTER — APPOINTMENT (OUTPATIENT)
Dept: PHYSICAL THERAPY | Facility: HOSPITAL | Age: 53
End: 2023-09-25
Attending: INTERNAL MEDICINE
Payer: COMMERCIAL

## 2023-09-29 ENCOUNTER — APPOINTMENT (OUTPATIENT)
Dept: PHYSICAL THERAPY | Facility: HOSPITAL | Age: 53
End: 2023-09-29
Attending: INTERNAL MEDICINE
Payer: COMMERCIAL

## 2023-10-02 ENCOUNTER — APPOINTMENT (OUTPATIENT)
Dept: PHYSICAL THERAPY | Facility: HOSPITAL | Age: 53
End: 2023-10-02
Attending: INTERNAL MEDICINE
Payer: COMMERCIAL

## 2023-10-06 ENCOUNTER — APPOINTMENT (OUTPATIENT)
Dept: PHYSICAL THERAPY | Facility: HOSPITAL | Age: 53
End: 2023-10-06
Attending: INTERNAL MEDICINE
Payer: COMMERCIAL

## 2023-10-17 ENCOUNTER — OFFICE VISIT (OUTPATIENT)
Dept: INTERNAL MEDICINE CLINIC | Facility: CLINIC | Age: 53
End: 2023-10-17

## 2023-10-17 VITALS
HEIGHT: 69 IN | SYSTOLIC BLOOD PRESSURE: 132 MMHG | HEART RATE: 81 BPM | WEIGHT: 192.38 LBS | BODY MASS INDEX: 28.49 KG/M2 | DIASTOLIC BLOOD PRESSURE: 68 MMHG

## 2023-10-17 DIAGNOSIS — E11.9 TYPE 2 DIABETES MELLITUS WITHOUT COMPLICATION, WITHOUT LONG-TERM CURRENT USE OF INSULIN (HCC): ICD-10-CM

## 2023-10-17 DIAGNOSIS — E78.5 DYSLIPIDEMIA ASSOCIATED WITH TYPE 2 DIABETES MELLITUS: ICD-10-CM

## 2023-10-17 DIAGNOSIS — I10 ESSENTIAL HYPERTENSION: ICD-10-CM

## 2023-10-17 DIAGNOSIS — D12.6 ADENOMATOUS POLYP OF COLON, UNSPECIFIED PART OF COLON: ICD-10-CM

## 2023-10-17 DIAGNOSIS — E11.69 DYSLIPIDEMIA ASSOCIATED WITH TYPE 2 DIABETES MELLITUS: ICD-10-CM

## 2023-10-17 DIAGNOSIS — Z00.00 ANNUAL PHYSICAL EXAM: Primary | ICD-10-CM

## 2023-10-17 PROCEDURE — 90471 IMMUNIZATION ADMIN: CPT | Performed by: INTERNAL MEDICINE

## 2023-10-17 PROCEDURE — 3008F BODY MASS INDEX DOCD: CPT | Performed by: INTERNAL MEDICINE

## 2023-10-17 PROCEDURE — 3075F SYST BP GE 130 - 139MM HG: CPT | Performed by: INTERNAL MEDICINE

## 2023-10-17 PROCEDURE — 99396 PREV VISIT EST AGE 40-64: CPT | Performed by: INTERNAL MEDICINE

## 2023-10-17 PROCEDURE — 3078F DIAST BP <80 MM HG: CPT | Performed by: INTERNAL MEDICINE

## 2023-10-17 PROCEDURE — 90686 IIV4 VACC NO PRSV 0.5 ML IM: CPT | Performed by: INTERNAL MEDICINE

## 2023-10-17 RX ORDER — ATORVASTATIN CALCIUM 20 MG/1
20 TABLET, FILM COATED ORAL DAILY
COMMUNITY
Start: 2023-07-13

## 2023-10-17 NOTE — PATIENT INSTRUCTIONS
Your blood pressure today was good at 132/68 and your exam was normal.  You received a flu shot today. Please get a COVID booster at your pharmacy. Please come in soon for blood and urine testing. Continue current medication, healthy diet and regular exercise. Please schedule an eye exam this year. Return visit in 6 months.

## 2023-10-21 ENCOUNTER — LAB ENCOUNTER (OUTPATIENT)
Dept: LAB | Facility: HOSPITAL | Age: 53
End: 2023-10-21
Attending: INTERNAL MEDICINE

## 2023-10-21 DIAGNOSIS — Z00.00 ANNUAL PHYSICAL EXAM: ICD-10-CM

## 2023-10-21 LAB
ALBUMIN SERPL-MCNC: 4 G/DL (ref 3.4–5)
ALBUMIN/GLOB SERPL: 1.1 {RATIO} (ref 1–2)
ALP LIVER SERPL-CCNC: 74 U/L
ALT SERPL-CCNC: 32 U/L
ANION GAP SERPL CALC-SCNC: 5 MMOL/L (ref 0–18)
AST SERPL-CCNC: 24 U/L (ref 15–37)
BILIRUB SERPL-MCNC: 0.5 MG/DL (ref 0.1–2)
BUN BLD-MCNC: 13 MG/DL (ref 7–18)
BUN/CREAT SERPL: 10.8 (ref 10–20)
CALCIUM BLD-MCNC: 9.4 MG/DL (ref 8.5–10.1)
CHLORIDE SERPL-SCNC: 110 MMOL/L (ref 98–112)
CHOLEST SERPL-MCNC: 159 MG/DL (ref ?–200)
CO2 SERPL-SCNC: 26 MMOL/L (ref 21–32)
COMPLEXED PSA SERPL-MCNC: 0.57 NG/ML (ref ?–4)
CREAT BLD-MCNC: 1.2 MG/DL
CREAT UR-SCNC: 123 MG/DL
DEPRECATED RDW RBC AUTO: 42.8 FL (ref 35.1–46.3)
EGFRCR SERPLBLD CKD-EPI 2021: 73 ML/MIN/1.73M2 (ref 60–?)
ERYTHROCYTE [DISTWIDTH] IN BLOOD BY AUTOMATED COUNT: 14.3 % (ref 11–15)
EST. AVERAGE GLUCOSE BLD GHB EST-MCNC: 169 MG/DL (ref 68–126)
FASTING PATIENT LIPID ANSWER: YES
FASTING STATUS PATIENT QL REPORTED: YES
GLOBULIN PLAS-MCNC: 3.7 G/DL (ref 2.8–4.4)
GLUCOSE BLD-MCNC: 124 MG/DL (ref 70–99)
HBA1C MFR BLD: 7.5 % (ref ?–5.7)
HCT VFR BLD AUTO: 49.4 %
HDLC SERPL-MCNC: 36 MG/DL (ref 40–59)
HGB BLD-MCNC: 16.3 G/DL
LDLC SERPL CALC-MCNC: 93 MG/DL (ref ?–100)
MCH RBC QN AUTO: 27.2 PG (ref 26–34)
MCHC RBC AUTO-ENTMCNC: 33 G/DL (ref 31–37)
MCV RBC AUTO: 82.5 FL
MICROALBUMIN UR-MCNC: <0.5 MG/DL
NONHDLC SERPL-MCNC: 123 MG/DL (ref ?–130)
OSMOLALITY SERPL CALC.SUM OF ELEC: 294 MOSM/KG (ref 275–295)
PLATELET # BLD AUTO: 303 10(3)UL (ref 150–450)
POTASSIUM SERPL-SCNC: 4.2 MMOL/L (ref 3.5–5.1)
PROT SERPL-MCNC: 7.7 G/DL (ref 6.4–8.2)
RBC # BLD AUTO: 5.99 X10(6)UL
SODIUM SERPL-SCNC: 141 MMOL/L (ref 136–145)
TRIGL SERPL-MCNC: 175 MG/DL (ref 30–149)
TSI SER-ACNC: 0.59 MIU/ML (ref 0.36–3.74)
VLDLC SERPL CALC-MCNC: 29 MG/DL (ref 0–30)
WBC # BLD AUTO: 6.2 X10(3) UL (ref 4–11)

## 2023-10-21 PROCEDURE — 36415 COLL VENOUS BLD VENIPUNCTURE: CPT

## 2023-10-21 PROCEDURE — 82043 UR ALBUMIN QUANTITATIVE: CPT

## 2023-10-21 PROCEDURE — 84443 ASSAY THYROID STIM HORMONE: CPT

## 2023-10-21 PROCEDURE — 80053 COMPREHEN METABOLIC PANEL: CPT

## 2023-10-21 PROCEDURE — 85027 COMPLETE CBC AUTOMATED: CPT

## 2023-10-21 PROCEDURE — 83036 HEMOGLOBIN GLYCOSYLATED A1C: CPT

## 2023-10-21 PROCEDURE — 82570 ASSAY OF URINE CREATININE: CPT

## 2023-10-21 PROCEDURE — 80061 LIPID PANEL: CPT

## 2023-11-01 ENCOUNTER — TELEPHONE (OUTPATIENT)
Dept: OPHTHALMOLOGY | Facility: CLINIC | Age: 53
End: 2023-11-01

## 2023-11-01 NOTE — TELEPHONE ENCOUNTER
Pt is overdue for annual exam - states he needs to come in before end of year - did not want next available appt - asking if Rn can do something for him for sooner appt

## 2023-11-06 NOTE — TELEPHONE ENCOUNTER
I have called the patient multiple times and left voice mails. He has not returned any calls. Closing this encounter.      PEGGY

## 2023-11-08 DIAGNOSIS — Z00.00 ANNUAL PHYSICAL EXAM: ICD-10-CM

## 2023-11-08 RX ORDER — AMLODIPINE BESYLATE 10 MG/1
10 TABLET ORAL DAILY
Qty: 90 TABLET | Refills: 3 | Status: CANCELLED | OUTPATIENT
Start: 2023-11-08

## 2023-11-08 RX ORDER — LISINOPRIL 10 MG/1
10 TABLET ORAL DAILY
Qty: 90 TABLET | Refills: 3 | Status: CANCELLED | OUTPATIENT
Start: 2023-11-08

## 2023-11-08 NOTE — TELEPHONE ENCOUNTER
Patient is requesting refills and mentions he is running low on dosage    Amlodipine    Lisinopril    Atorvastatin 10 mg    Diabetic medicines 2 of them but does not know the name, perhaps knows as Andrew Quiles

## 2023-11-08 NOTE — TELEPHONE ENCOUNTER
Refill passed per [facility] protocol. Requested Prescriptions   Pending Prescriptions Disp Refills    amLODIPine 10 MG Oral Tab 90 tablet 1     Sig: Take 1 tablet (10 mg total) by mouth daily.        Hypertensive Medications Protocol Passed - 11/8/2023  2:03 PM        Passed - In person appointment in the past 12 or next 3 months     Recent Outpatient Visits              3 weeks ago Annual physical exam    Nancy Toscano MD    Office Visit    3 months ago Left shoulder pain, unspecified chronicity    Amalia Wilcox MD    Office Visit    1 year ago Left shoulder pain, unspecified chronicity    Amalia Wilcox MD    Office Visit    1 year ago Age-related nuclear cataract of both eyes    [address] [place],[address], [address], Jeramy Silveira MD    Office Visit    1 year ago Annual physical exam    Amalia Wilcox MD    Office Visit                      Passed - Last BP reading less than 140/90     BP Readings from Last 1 Encounters:   10/17/23 132/68               Passed - CMP or BMP in past 6 months     Recent Results (from the past 4392 hour(s))   Comp Metabolic Panel (14)    Collection Time: 10/21/23 10:20 AM   Result Value Ref Range    Glucose 124 (H) 70 - 99 mg/dL    Sodium 141 136 - 145 mmol/L    Potassium 4.2 3.5 - 5.1 mmol/L    Chloride 110 98 - 112 mmol/L    CO2 26.0 21.0 - 32.0 mmol/L    Anion Gap 5 0 - 18 mmol/L    BUN 13 7 - 18 mg/dL    Creatinine 1.20 0.70 - 1.30 mg/dL    BUN/CREA Ratio 10.8 10.0 - 20.0    Calcium, Total 9.4 8.5 - 10.1 mg/dL    Calculated Osmolality 294 275 - 295 mOsm/kg    eGFR-Cr 73 >=60 mL/min/1.73m2    ALT 32 16 - 61 U/L    AST 24 15 - 37 U/L    Alkaline Phosphatase 74 45 - 117 U/L    Bilirubin, Total 0.5 0.1 - 2.0 mg/dL    Total Protein 7.7 6.4 - 8.2 g/dL    Albumin 4.0 3.4 - 5.0 g/dL    Globulin  3.7 2.8 - 4.4 g/dL    A/G Ratio 1.1 1.0 - 2.0    Patient Fasting for CMP? Yes      *Note: Due to a large number of results and/or encounters for the requested time period, some results have not been displayed. A complete set of results can be found in Results Review. Passed - In person appointment or virtual visit in the past 6 months     Recent Outpatient Visits              3 weeks ago Annual physical exam    Cyrus Konig, Leanor Hashimoto, MD    Office Visit    3 months ago Left shoulder pain, unspecified chronicity    Cyrus Konig, Leanor Hashimoto, MD    Office Visit    1 year ago Left shoulder pain, unspecified chronicity    Cyrus Konig, Leanor Hashimoto, MD    Office Visit    1 year ago Age-related nuclear cataract of both eyes    Dominique Cartwright MD    Office Visit    1 year ago Annual physical exam    Aneta Ring MD    Office Visit                      Passed - EGFRCR or GFRAA > 50     GFR Evaluation  EGFRCR: 73 , resulted on 10/21/2023            lisinopril 10 MG Oral Tab 90 tablet 1     Sig: Take 1 tablet (10 mg total) by mouth daily.        Hypertensive Medications Protocol Passed - 11/8/2023  2:03 PM        Passed - In person appointment in the past 12 or next 3 months     Recent Outpatient Visits              3 weeks ago Annual physical exam    Aneta Ring MD    Office Visit    3 months ago Left shoulder pain, unspecified chronicity    Cyrus Konig, Leanor Hashimoto, MD    Office Visit    1 year ago Left shoulder pain, unspecified chronicity    [facility] Diya Coto MD    Office Visit    1 year ago Age-related nuclear cataract of both eyes    [address], [address], Tere Resendiz MD    Office Visit    1 year ago Annual physical exam    Ruddy Elizabeth MD    Office Visit                      Passed - Last BP reading less than 140/90     BP Readings from Last 1 Encounters:   10/17/23 132/68               Passed - CMP or BMP in past 6 months     Recent Results (from the past 4392 hour(s))   Comp Metabolic Panel (14)    Collection Time: 10/21/23 10:20 AM   Result Value Ref Range    Glucose 124 (H) 70 - 99 mg/dL    Sodium 141 136 - 145 mmol/L    Potassium 4.2 3.5 - 5.1 mmol/L    Chloride 110 98 - 112 mmol/L    CO2 26.0 21.0 - 32.0 mmol/L    Anion Gap 5 0 - 18 mmol/L    BUN 13 7 - 18 mg/dL    Creatinine 1.20 0.70 - 1.30 mg/dL    BUN/CREA Ratio 10.8 10.0 - 20.0    Calcium, Total 9.4 8.5 - 10.1 mg/dL    Calculated Osmolality 294 275 - 295 mOsm/kg    eGFR-Cr 73 >=60 mL/min/1.73m2    ALT 32 16 - 61 U/L    AST 24 15 - 37 U/L    Alkaline Phosphatase 74 45 - 117 U/L    Bilirubin, Total 0.5 0.1 - 2.0 mg/dL    Total Protein 7.7 6.4 - 8.2 g/dL    Albumin 4.0 3.4 - 5.0 g/dL    Globulin  3.7 2.8 - 4.4 g/dL    A/G Ratio 1.1 1.0 - 2.0    Patient Fasting for CMP? Yes      *Note: Due to a large number of results and/or encounters for the requested time period, some results have not been displayed. A complete set of results can be found in Results Review.                Passed - In person appointment or virtual visit in the past 6 months     Recent Outpatient Visits              3 weeks ago Annual physical exam    Edi Rios MD    Office Visit    3 months ago Left shoulder pain, unspecified chronicity    Edi Rios MD    Office Visit    1 year ago Left shoulder pain, unspecified chronicity    Jamey Contreras MD    Office Visit    1 year ago Age-related nuclear cataract of both eyes    Grace Barnhart MD    Office Visit    1 year ago Annual physical exam    Rocio Staley MD    Office Visit                      Passed - EGFRCR or GFRAA > 50     GFR Evaluation  EGFRCR: 73 , resulted on 10/21/2023            SITagliptin Phosphate (JANUVIA) 100 MG Oral Tab 90 tablet 1     Sig: TAKE 1 TABLET(100 MG) BY MOUTH DAILY       Diabetes Medication Protocol Passed - 11/8/2023  2:03 PM        Passed - Last A1C < 7.5 and within past 6 months     Lab Results   Component Value Date    A1C 7.5 (H) 10/21/2023             Passed - In person appointment or virtual visit in the past 6 mos or appointment in next 3 mos     Recent Outpatient Visits              3 weeks ago Annual physical exam    Jamey Contreras MD    Office Visit    3 months ago Left shoulder pain, unspecified chronicity    Jamey Contreras MD    Office Visit    1 year ago Left shoulder pain, unspecified chronicity    Jamey Contreras MD    Office Visit    1 year ago Age-related nuclear cataract of both eyes    Grace Barnhart MD    Office Visit    1 year ago Annual physical exam    Rocio Staley MD    Office Visit                      Passed - EGFRCR or GFRAA > 50     GFR Evaluation  EGFRCR: 73 , resulted on 10/21/2023          Passed - GFR in the past 12 months          empagliflozin (JARDIANCE) 10 MG Oral Tab 90 tablet 1     Sig: Take 1 tablet (10 mg total) by mouth daily. Diabetes Medication Protocol Passed - 11/8/2023  2:03 PM        Passed - Last A1C < 7.5 and within past 6 months     Lab Results   Component Value Date    A1C 7.5 (H) 10/21/2023             Passed - In person appointment or virtual visit in the past 6 mos or appointment in next 3 mos     Recent Outpatient Visits              3 weeks ago Annual physical exam    Jenny Lawrence MD    Office Visit    3 months ago Left shoulder pain, unspecified chronicity    Ivey Phi, Thersia Boast, MD    Office Visit    1 year ago Left shoulder pain, unspecified chronicity    Ivey Phi, Thersia Boast, MD    Office Visit    1 year ago Age-related nuclear cataract of both eyes    Davida Shcwartz MD    Office Visit    1 year ago Annual physical exam    Jenny Lawrence MD    Office Visit                      Passed - EGFRCR or GFRAA > 50     GFR Evaluation  EGFRCR: 73 , resulted on 10/21/2023          Passed - GFR in the past 12 months             Recent Outpatient Visits              3 weeks ago Annual physical exam    Ivey Phi, Thersia Boast, MD    Office Visit    3 months ago Left shoulder pain, unspecified chronicity    Ivey Phi, Thersia Boast, MD    Office Visit    1 year ago Left shoulder pain, unspecified chronicity    Jenny Lawrence MD    Office Visit    1 year ago Age-related nuclear cataract of both eyes    Davida Schwartz MD    Office Visit    1 year ago Annual physical exam    Ivey Phi, Thersia Boast, MD Office Visit

## 2023-11-10 RX ORDER — LISINOPRIL 10 MG/1
10 TABLET ORAL DAILY
Qty: 90 TABLET | Refills: 3 | Status: SHIPPED | OUTPATIENT
Start: 2023-11-10

## 2023-11-10 RX ORDER — ATORVASTATIN CALCIUM 20 MG/1
20 TABLET, FILM COATED ORAL DAILY
Qty: 90 TABLET | Refills: 3 | Status: SHIPPED | OUTPATIENT
Start: 2023-11-10

## 2023-11-10 RX ORDER — AMLODIPINE BESYLATE 10 MG/1
10 TABLET ORAL DAILY
Qty: 90 TABLET | Refills: 3 | Status: SHIPPED | OUTPATIENT
Start: 2023-11-10

## 2023-11-10 NOTE — TELEPHONE ENCOUNTER
Dr. Nai Lyons - all other refills addressed, except for Externally Reported Atorvastatin 20mg daily. Pended, please advise. Patient has read [employer] [place] [facility], but No response yet. Therefore, RN called patient. Patient's date of birth and full name both confirmed. He says he never picked up medications from [facility] - maybe it was sent, but he never picked them up. That previous provider mentioned below was when he was with a [employer], and he is now seeing dr. Nai Lyons for medication management. Requesting:     Amlodipine     Lisinopril     Atorvastatin     Jardiance    Januvia     Pended for protocol         Refill passed per [facility] protocol. Requested Prescriptions   Pending Prescriptions Disp Refills    SITagliptin Phosphate (JANUVIA) 100 MG Oral Tab 90 tablet 3     Sig: Take 1 tablet (100 mg total) by mouth daily.        Diabetes Medication Protocol Passed - 11/10/2023  4:31 PM        Passed - Last A1C < 7.5 and within past 6 months     Lab Results   Component Value Date    A1C 7.5 (H) 10/21/2023             Passed - In person appointment or virtual visit in the past 6 mos or appointment in next 3 mos     Recent Outpatient Visits              3 weeks ago Annual physical exam    Aneta Ring MD    Office Visit    3 months ago Left shoulder pain, unspecified chronicity    Cyrus Konig, Leanor Hashimoto, MD    Office Visit    1 year ago Left shoulder pain, unspecified chronicity    Cyrus Konig, Leanor Hashimoto, MD    Office Visit    1 year ago Age-related nuclear cataract of both eyes    [address] [facility], Dominique Nichols MD    Office Visit    1 year ago Annual physical exam    Aneta Ring MD    Office Visit                      Passed - EGFRCR or GFRAA > 50 GFR Evaluation  EGFRCR: 73 , resulted on 10/21/2023          Passed - GFR in the past 12 months          empagliflozin (JARDIANCE) 10 MG Oral Tab 90 tablet 3     Sig: Take 1 tablet (10 mg total) by mouth daily. Diabetes Medication Protocol Passed - 11/10/2023  4:31 PM        Passed - Last A1C < 7.5 and within past 6 months     Lab Results   Component Value Date    A1C 7.5 (H) 10/21/2023             Passed - In person appointment or virtual visit in the past 6 mos or appointment in next 3 mos     Recent Outpatient Visits              3 weeks ago Annual physical exam    Zoraida Ivory MD    Office Visit    3 months ago Left shoulder pain, unspecified chronicity    Merl Notch, Ralph Moreira MD    Office Visit    1 year ago Left shoulder pain, unspecified chronicity    Merl Notch, Ralph Moreira MD    Office Visit    1 year ago Age-related nuclear cataract of both eyes    Renny Sanchez MD    Office Visit    1 year ago Annual physical exam    Zoraida Ivory MD    Office Visit                      Passed - EGFRCR or GFRAA > 50     GFR Evaluation  EGFRCR: 73 , resulted on 10/21/2023          Passed - GFR in the past 12 months          amLODIPine 10 MG Oral Tab 90 tablet 1     Sig: Take 1 tablet (10 mg total) by mouth daily.        Hypertensive Medications Protocol Passed - 11/10/2023  4:31 PM        Passed - In person appointment in the past 12 or next 3 months     Recent Outpatient Visits              3 weeks ago Annual physical exam    Zoraida Ivory MD    Office Visit    3 months ago Left shoulder pain, unspecified chronicity    [facility], [address] [facility], Lubna Mauricio MD    Office Visit    1 year ago Left shoulder pain, unspecified chronicity    Marshall Riggins MD    Office Visit    1 year ago Age-related nuclear cataract of both eyes    [address], [address] [facility], Jeff Grajeda MD    Office Visit    1 year ago Annual physical exam    Liu Gee MD    Office Visit                      Passed - Last BP reading less than 140/90     BP Readings from Last 1 Encounters:   10/17/23 132/68               Passed - CMP or BMP in past 6 months     Recent Results (from the past 4392 hour(s))   Comp Metabolic Panel (14)    Collection Time: 10/21/23 10:20 AM   Result Value Ref Range    Glucose 124 (H) 70 - 99 mg/dL    Sodium 141 136 - 145 mmol/L    Potassium 4.2 3.5 - 5.1 mmol/L    Chloride 110 98 - 112 mmol/L    CO2 26.0 21.0 - 32.0 mmol/L    Anion Gap 5 0 - 18 mmol/L    BUN 13 7 - 18 mg/dL    Creatinine 1.20 0.70 - 1.30 mg/dL    BUN/CREA Ratio 10.8 10.0 - 20.0    Calcium, Total 9.4 8.5 - 10.1 mg/dL    Calculated Osmolality 294 275 - 295 mOsm/kg    eGFR-Cr 73 >=60 mL/min/1.73m2    ALT 32 16 - 61 U/L    AST 24 15 - 37 U/L    Alkaline Phosphatase 74 45 - 117 U/L    Bilirubin, Total 0.5 0.1 - 2.0 mg/dL    Total Protein 7.7 6.4 - 8.2 g/dL    Albumin 4.0 3.4 - 5.0 g/dL    Globulin  3.7 2.8 - 4.4 g/dL    A/G Ratio 1.1 1.0 - 2.0    Patient Fasting for CMP? Yes      *Note: Due to a large number of results and/or encounters for the requested time period, some results have not been displayed. A complete set of results can be found in Results Review.                Passed - In person appointment or virtual visit in the past 6 months     Recent Outpatient Visits              3 weeks ago Annual physical exam    Marshall Riggins MD    Office Visit    3 months ago Left shoulder pain, unspecified chronicity    Eduin Choi MD    Office Visit    1 year ago Left shoulder pain, unspecified chronicity    Otto Keita MD    Office Visit    1 year ago Age-related nuclear cataract of both eyes    [facility] Tucker Meagan Puri MD    Office Visit    1 year ago Annual physical exam    Eduin Choi MD    Office Visit                      Passed - EGFRCR or GFRAA > 50     GFR Evaluation  EGFRCR: 73 , resulted on 10/21/2023            lisinopril 10 MG Oral Tab 90 tablet 1     Sig: Take 1 tablet (10 mg total) by mouth daily.        Hypertensive Medications Protocol Passed - 11/10/2023  4:31 PM        Passed - In person appointment in the past 12 or next 3 months     Recent Outpatient Visits              3 weeks ago Annual physical exam    Eduin Choi MD    Office Visit    3 months ago Left shoulder pain, unspecified chronicity    Otto Keita MD    Office Visit    1 year ago Left shoulder pain, unspecified chronicity    Otto Keita MD    Office Visit    1 year ago Age-related nuclear cataract of both eyes    [facility] Meagan Miller MD    Office Visit    1 year ago Annual physical exam    Eduin Choi MD    Office Visit                      Passed - Last BP reading less than 140/90     BP Readings from Last 1 Encounters:   10/17/23 132/68               Passed - CMP or BMP in past 6 months     Recent Results (from the past 4392 hour(s))   Comp Metabolic Panel (14)    Collection Time: 10/21/23 10:20 AM   Result Value Ref Range    Glucose 124 (H) 70 - 99 mg/dL    Sodium 141 136 - 145 mmol/L    Potassium 4.2 3.5 - 5.1 mmol/L    Chloride 110 98 - 112 mmol/L    CO2 26.0 21.0 - 32.0 mmol/L    Anion Gap 5 0 - 18 mmol/L    BUN 13 7 - 18 mg/dL    Creatinine 1.20 0.70 - 1.30 mg/dL    BUN/CREA Ratio 10.8 10.0 - 20.0    Calcium, Total 9.4 8.5 - 10.1 mg/dL    Calculated Osmolality 294 275 - 295 mOsm/kg    eGFR-Cr 73 >=60 mL/min/1.73m2    ALT 32 16 - 61 U/L    AST 24 15 - 37 U/L    Alkaline Phosphatase 74 45 - 117 U/L    Bilirubin, Total 0.5 0.1 - 2.0 mg/dL    Total Protein 7.7 6.4 - 8.2 g/dL    Albumin 4.0 3.4 - 5.0 g/dL    Globulin  3.7 2.8 - 4.4 g/dL    A/G Ratio 1.1 1.0 - 2.0    Patient Fasting for CMP? Yes      *Note: Due to a large number of results and/or encounters for the requested time period, some results have not been displayed. A complete set of results can be found in Results Review. Passed - In person appointment or virtual visit in the past 6 months     Recent Outpatient Visits              3 weeks ago Annual physical exam    Pari Villalba MD    Office Visit    3 months ago [facility] shoulder pain, unspecified chronicity    [facility]    [facility]    1 year ago Left shoulder pain, unspecified chronicity    [facility]    Office Visit    1 year ago Age-related nuclear cataract of both eyes    Priyanka Olsen MD    Office Visit    1 year ago Annual physical exam    Pari Villalba MD    Office Visit                      Passed - EGFRCR or GFRAA > 50     GFR Evaluation  EGFRCR: 73 , resulted on 10/21/2023            atorvastatin 20 MG Oral Tab  0     Sig: Take 1 tablet (20 mg total) by mouth daily.        Cholesterol Medication Protocol Passed - 11/10/2023  4:31 PM        Passed - ALT in past 12 months        Passed - LDL in past 12 months        Passed - Last ALT < 80     Lab Results   Component Value Date    ALT 32 10/21/2023             Passed - Last LDL < 130     Lab Results   Component Value Date    LDL 93 10/21/2023             Passed - In person appointment or virtual visit in the past 12 mos or appointment in next 3 mos     Recent Outpatient Visits              3 weeks ago Annual physical exam    Brock Puckett MD    Office Visit    3 months ago Left shoulder pain, unspecified chronicity    Zach Borrego MD    Office Visit    1 year ago Left shoulder pain, unspecified chronicity    Zach Borrego MD    Office Visit    1 year ago Age-related nuclear cataract of both eyes    [address] [facility], Gonzalo Moffett MD    Office Visit    1 year ago Annual physical exam    Zach Borrego MD    Office Visit                         Recent Outpatient Visits              3 weeks ago Annual physical exam    Zach Borrego MD    Office Visit    3 months ago Left shoulder pain, unspecified chronicity    Zach Borrego MD    Office Visit    1 year ago Left shoulder pain, unspecified chronicity    Brock Puckett MD    Office Visit    1 year ago Age-related nuclear cataract of both eyes    [address] [facility], Gonzalo Moffett MD    Office Visit    1 year ago Annual physical exam    Zach Borrego MD    Office Visit

## 2023-11-14 ENCOUNTER — TELEPHONE (OUTPATIENT)
Dept: INTERNAL MEDICINE CLINIC | Facility: CLINIC | Age: 53
End: 2023-11-14

## 2023-11-14 DIAGNOSIS — E11.9 TYPE 2 DIABETES MELLITUS WITHOUT COMPLICATION, WITHOUT LONG-TERM CURRENT USE OF INSULIN (HCC): Primary | ICD-10-CM

## 2023-11-14 NOTE — TELEPHONE ENCOUNTER
Patient calling regarding Michelle Dallasdiance not covered by insurance doctor sent [place] instead.     Calling pharmacy regarding Gia Shelton pending Prior Auth) and Georgina Aguirre also requires prior [place]

## 2023-11-14 NOTE — TELEPHONE ENCOUNTER
The drug you are requesting prior authorization for is not covered. Please select an alternative drug from the choices provided and send in a new prescription for that drug. You may also select \"Complete PA\" to obtain a PA for the originally requested drug.   METFORMIN HCL TABS 500MG  METFORMIN hcl 850MG  METFORMIN HCL 1000  METFORMIN HCL ER 750MG  METFORMIN HCL ER 1000  FARXIGA TAB 5MG  FARXIGA TAB 10MG

## 2023-11-14 NOTE — TELEPHONE ENCOUNTER
Teodora Rosen sent patient mychart message    Prior authorization approved Case ID: [id number]      Payer: Rosamaria Jade 19    [phone]    [phone]   [facility][id number];ZCFICD:[id number]; Review Type:Prior Auth; Coverage Start Date:10/15/2023; Coverage End Date:11/13/2024;    Approval Details    Authorized from October 15, 2023 to November 13, 2024

## 2023-11-14 NOTE — TELEPHONE ENCOUNTER
Prescription for Farxiga 10 mg 1 tablet daily sent to pharmacy. Please notify patient of change from [facility] to [place], as requested by his insurance.

## 2023-11-14 NOTE — TELEPHONE ENCOUNTER
Received fax requesting PA      Current Outpatient Medications:       empagliflozin (JARDIANCE) 10 MG Oral Tab, Take 1 tablet (10 mg total) by mouth daily. , Disp: 90 tablet, Rfl: 3    PATIENT ID#   [id number]

## 2023-11-15 NOTE — TELEPHONE ENCOUNTER
Mason Torres denied  Denied    CaseId:[id number];Status:Denied; Review Type:Prior Auth; Appeal Information: Attention:ATTN: [address] [id number]. [place] CROW,[id number] TLJTS:[phone] DIF:[phone];  Important - Please read the below note on eAppeals: Please reference the denial letter for information on the rights for an appeal, rationale for the denial, and how to submit an appeal including if any information is needed to support the appeal. Note about urgent situations - Generally, an urgent situation is one which, in the opinion of the provider, the health of the patient may be in serious jeopardy or may experience pain that cannot be adequately controlled while waiting for a decision on the appeal.; Case ID: [id number]      Payer: [address] [place] [address],[address]    [phone]   Electronic appeal: Supported   View History

## 2023-11-16 NOTE — TELEPHONE ENCOUNTER
According to the denial letter, Verlon Girt would be approved if he has previously tried metformin. He was on metformin in the past but cannot tolerate it because of GI side effects. Could his insurance plan be contacted with this additional information so that Verlon Girt can be approved?

## 2023-11-16 NOTE — TELEPHONE ENCOUNTER
Calling regarding Jesús Butler [phone] Amador Kumar Approved  10/17/2023-11/15/2024  AK#[id number]    Sent patient mychart message to notify

## 2023-11-16 NOTE — TELEPHONE ENCOUNTER
Please notify patient that Rajesh Munoz is not covered by his insurance either. He is intolerant of glimepiride and metformin. Neither Nick Dewey or Kristin Jarrett are covered by his insurance. I have few reasonable options left available. Recommend he schedule a video visit with me to discuss treatment options as his insurance plan seems very restrictive.

## 2023-12-11 ENCOUNTER — TELEPHONE (OUTPATIENT)
Dept: INTERNAL MEDICINE CLINIC | Facility: CLINIC | Age: 53
End: 2023-12-11

## 2023-12-11 DIAGNOSIS — Z00.00 ANNUAL PHYSICAL EXAM: ICD-10-CM

## 2023-12-11 NOTE — TELEPHONE ENCOUNTER
I called the patient and informed on 11/10/23  [id number] tablets and 3 refill were sent into the [facility] in [address] [place] [address]. He stated [facility] told him that they do not have a script. I called [facility] who stated they never received the 11/10/23 refill of Januvia  I resent the scripts.

## 2023-12-11 NOTE — TELEPHONE ENCOUNTER
Patient called requesting refill on medication.        Medication Quantity Refills Start End   SITagliptin Phosphate (JANUVIA) 100 MG Oral Tab

## 2024-02-26 ENCOUNTER — TELEPHONE (OUTPATIENT)
Dept: INTERNAL MEDICINE CLINIC | Facility: CLINIC | Age: 54
End: 2024-02-26

## 2024-02-26 DIAGNOSIS — Z00.00 ANNUAL PHYSICAL EXAM: ICD-10-CM

## 2024-02-26 NOTE — TELEPHONE ENCOUNTER
Pt would like a refill on  Rx Jardiance 10MG for 90 tablets. Pt use to get medication but insurance changed so he had to discontinue it. Pt stt he now has his old insurance [employer] so he would like to start using it again.  Pt is out of medication. Please advise           *verified pharmacy [facility] [address] in [facility]

## 2024-02-26 NOTE — TELEPHONE ENCOUNTER
FYI: Talked to patient told him to call his insurance due to he is not assign to [facility], he is assign to :    [employer] Name  [facility]  N  Address  [address]    [address]    [place] [address]  Telephone  [phone]    Group #: [id number]    Patient will call back once he will be assign to [facility], Group# [id number] PCP Dr Krueger.  Patient understood.

## 2024-02-26 NOTE — TELEPHONE ENCOUNTER
[employer] active,prescription update sent via [employer] .   CSS=please call and assists =see my previous note regarding the insurance update, thanks.

## 2024-02-26 NOTE — TELEPHONE ENCOUNTER
See 11/14/23 PA encounter  ==Jardiance  was changed to Farxiga  due to insurance coverage.         RN called the patient and verified the request .   Stated that he NEVER started taking the Farxiga.   He has left over 90 day supply of Jardiance  and it just finished today .   He has  new insurance and [employer] has the information.   He does not  know how to update his insurance information via [employer], patient is requesting to call him back and assist for the insurance update.   Confirmed that the patient is taking Januvia 100 mg daily and Jardiance 10  mg daily, his blood sugar is controlled.    RN updated medication record.         CSS=please call patient and assist to update the insurance informations.   Dr Krueger=see above, pended requested  medication .

## 2024-04-13 ENCOUNTER — OFFICE VISIT (OUTPATIENT)
Dept: OPHTHALMOLOGY | Facility: CLINIC | Age: 54
End: 2024-04-13

## 2024-04-13 DIAGNOSIS — E11.9 TYPE 2 DIABETES MELLITUS WITHOUT RETINOPATHY (HCC): Primary | ICD-10-CM

## 2024-04-13 DIAGNOSIS — H25.13 AGE-RELATED NUCLEAR CATARACT OF BOTH EYES: ICD-10-CM

## 2024-04-13 PROCEDURE — 2023F DILAT RTA XM W/O RTNOPTHY: CPT | Performed by: OPHTHALMOLOGY

## 2024-04-13 PROCEDURE — 92014 COMPRE OPH EXAM EST PT 1/>: CPT | Performed by: OPHTHALMOLOGY

## 2024-04-13 NOTE — PROGRESS NOTES
Ron Roberts is a 53 year old male.    HPI:     HPI    Pt is here for diabetic eye exam.  Pt denies vision changes.    Pt has been a diabetic for 20 years       Pt's diabetes is currently controlled by pills    Pt checks blood sugar rarely  Pt's last blood sugar was  124 on 10/21/23  Last HA1C was 7.5 on 10/21/23  Endocrinologist: none        Last edited by Adeline Quinn OCASSIUS on 4/13/2024  9:52 AM.        Patient History:  Past Medical History:    Diverticulosis    CT scan 3-17 and colonoscopy 5-17    Dyslipidemia associated with type 2 diabetes mellitus (HCC)    Esophageal reflux    Essential hypertension    Hx of adenomatous colonic polyps    none in 2020. Repeat CLN in 2025    Liver cyst    simple liver cyst on MRI liver    Type 2 diabetes mellitus (HCC)    Vitamin D deficiency       Surgical History: Ron Roberts has a past surgical history that includes colonoscopy (N/A, 5/16/2017) (Procedure: COLONOSCOPY;  Surgeon: WALTER Parra MD;  Location: [facility] ENDOSCOPY) and colonoscopy (N/A, 7/1/2020) (Procedure: COLONOSCOPY;  Surgeon: WALTER Parra MD;  Location: [facility] ENDOSCOPY).    Family History   Problem Relation Age of Onset    Diabetes Mother     Hypertension Mother     Glaucoma Neg     Macular degeneration Neg        Social History:   Social History     Socioeconomic History    Marital status:    Tobacco Use    Smoking status: Never    Smokeless tobacco: Never   Vaping Use    Vaping status: Never Used   Substance and Sexual Activity    Alcohol use: Yes     Alcohol/week: 1.0 standard drink of alcohol     Types: 1 Glasses of wine per week     Comment: Infrequent wine    Drug use: No    Sexual activity: Not Currently     Partners: Female       Medications:  Current Outpatient Medications   Medication Sig Dispense Refill    empagliflozin (JARDIANCE) 10 MG Oral Tab Take 1 tablet (10 mg total) by mouth daily. 90 tablet 3    SITagliptin Phosphate (JANUVIA) 100 MG Oral Tab Take 1  tablet (100 mg total) by mouth daily. 90 tablet 3    amLODIPine 10 MG Oral Tab Take 1 tablet (10 mg total) by mouth daily. 90 tablet 3    lisinopril 10 MG Oral Tab Take 1 tablet (10 mg total) by mouth daily. 90 tablet 3    atorvastatin 20 MG Oral Tab Take 1 tablet (20 mg total) by mouth daily. 90 tablet 3    Cholecalciferol (VITAMIN D3) 25 MCG (1000 UT) Oral Cap Take 1 tablet by mouth daily.      Multiple Vitamins-Minerals ([facility] DAILY MULTIPLE FOR MEN) Oral Tab Take by mouth.         Allergies:  No Known Allergies    ROS:     ROS    Positive for: Endocrine, Eyes  Negative for: Constitutional, Gastrointestinal, Neurological, Skin, Genitourinary, Musculoskeletal, HENT, Cardiovascular, Respiratory, Psychiatric, Allergic/Imm, Heme/Lymph  Last edited by Adeline Quinn, O.T. on 4/13/2024  9:52 AM.          PHYSICAL EXAM:     Base Eye Exam       Visual Acuity (Snellen - Linear)         Right Left    Dist cc 20/20 -1 20/20    Near cc 20/20 20/20      Correction: Glasses              Tonometry (Icare, 9:59 AM)         Right Left    Pressure 18 18              Pupils         Pupils    Right PERRL    Left PERRL              Visual Fields         Left Right     Full Full              Extraocular Movement         Right Left     Full, Ortho Full, Ortho              Neuro/Psych       Oriented x3: Yes    Mood/Affect: Normal              Dilation       Both eyes: 1.0% Mydriacyl and 2.5% Sushil Synephrine @ 9:59 AM              Dilation #2       Both eyes: 1.0% Mydriacyl and 2.5% Sushil Synephrine @ 10:00 AM                  Slit Lamp and Fundus Exam       External Exam         Right Left    External Normal Normal              Slit Lamp Exam         Right Left    Lids/Lashes Meibomian gland dysfunction Meibomian gland dysfunction    Conjunctiva/Sclera Nasal pinguecula, Ocular Melanosis Nasal/temp pinguecula, Ocular Melanosis    Cornea trace Arcus trace Arcus    Anterior Chamber Deep and quiet Deep and quiet    Iris Normal Normal    Lens  1+ Nuclear sclerosis, Trace Cortical cataract 1+ Nuclear sclerosis    Vitreous Clear Clear              Fundus Exam         Right Left    Disc Good rim Good rim    C/D Ratio 0.5 0.5    Macula Normal-no BDR Normal-no BDR    Vessels Normal Normal    Periphery Normal Normal                  Refraction       Wearing Rx         Sphere Cylinder Axis Add    Right -1.50 +1.00 180 +1.50    Left -1.50 +1.25 005 +1.50      Age: 1yr    Type: Progressive bifocal              Manifest Refraction    Declined refraction, happy with current prescription.                    ASSESSMENT/PLAN:     Diagnoses and Plan:     Type 2 diabetes mellitus without retinopathy (HCC)  Diabetes type II: no background of retinopathy, no signs of neovascularization noted.  Discussed ocular and systemic benefits of blood sugar control.  Diagnosis and treatment discussed in detail with patient.    Will see patient in 1 year for a diabetic exam    Age-related nuclear cataract of both eyes  Discussed early cataracts with patient.  No treatment recommended at this time.     No orders of the defined types were placed in this encounter.      Meds This Visit:  Requested Prescriptions      No prescriptions requested or ordered in this encounter        Follow up instructions:  Return in about 1 year (around 4/13/2025) for Diabetic eye exam.    4/13/2024  Scribed by: Cory Vargas MD

## 2024-04-13 NOTE — PATIENT INSTRUCTIONS
Type 2 diabetes mellitus without retinopathy (HCC)  Diabetes type II: no background of retinopathy, no signs of neovascularization noted.  Discussed ocular and systemic benefits of blood sugar control.  Diagnosis and treatment discussed in detail with patient.    Will see patient in 1 year for a diabetic exam    Age-related nuclear cataract of both eyes  Discussed early cataracts with patient.  No treatment recommended at this time.

## 2024-09-11 ENCOUNTER — OFFICE VISIT (OUTPATIENT)
Dept: INTERNAL MEDICINE CLINIC | Facility: CLINIC | Age: 54
End: 2024-09-11

## 2024-09-11 ENCOUNTER — NURSE TRIAGE (OUTPATIENT)
Dept: INTERNAL MEDICINE CLINIC | Facility: CLINIC | Age: 54
End: 2024-09-11

## 2024-09-11 VITALS
WEIGHT: 197.19 LBS | SYSTOLIC BLOOD PRESSURE: 130 MMHG | BODY MASS INDEX: 29.2 KG/M2 | HEART RATE: 85 BPM | HEIGHT: 69 IN | DIASTOLIC BLOOD PRESSURE: 82 MMHG

## 2024-09-11 DIAGNOSIS — E11.9 TYPE 2 DIABETES MELLITUS WITHOUT COMPLICATION, WITHOUT LONG-TERM CURRENT USE OF INSULIN (HCC): Primary | ICD-10-CM

## 2024-09-11 PROCEDURE — 99213 OFFICE O/P EST LOW 20 MIN: CPT | Performed by: INTERNAL MEDICINE

## 2024-09-11 RX ORDER — GLIPIZIDE 5 MG/1
5 TABLET, FILM COATED, EXTENDED RELEASE ORAL DAILY
COMMUNITY
Start: 2024-09-03 | End: 2024-09-11

## 2024-09-11 RX ORDER — GLIPIZIDE 5 MG/1
5 TABLET, FILM COATED, EXTENDED RELEASE ORAL 2 TIMES DAILY WITH MEALS
Qty: 60 TABLET | Refills: 2 | Status: SHIPPED | OUTPATIENT
Start: 2024-09-11

## 2024-09-11 NOTE — PATIENT INSTRUCTIONS
Please increase glipizide to 5 mg twice daily until you are able to resume Jardiance and Januvia  Return when you can for a physical and labs

## 2024-09-11 NOTE — PROGRESS NOTES
Ron Roberts is a 53 year old male.   Chief Complaint   Patient presents with    Diabetes    Hypertension     HPI:   Ron presents this afternoon to discuss alternative and less expensive treatment for his diabetes.    His last visit with me was for a physical October 2023.  He lost his job this past February, and has been without medical insurance since March.  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  He is actively interviewing for new jobs and hopes to have a job with medical insurance soon.    He was able to take his regular medications, including Jardiance and Januvia for diabetes, until about 2 weeks ago when he ran out of Jardiance and Januvia.  He continues to take lisinopril amlodipine and atorvastatin.  He is unable to afford Jardiance and Januvia.  [facility], when his Accu-Chek readings at home became high at 200-300.  Glipizide 5 mg once daily was prescribed, and he has been on glipizide for 1 week.  Accu-Cheks remain high.    He is working with a company that provides assistance with medications and hopes to be able to obtain Jardiance and Januvia soon.  He wonders what can be done in terms of medication in the interim.  He previously took metformin but could not tolerate it because of GI side effects.  Current Outpatient Medications   Medication Sig Dispense Refill    glipiZIDE ER 5 MG Oral Tablet 24 Hr Take 1 tablet (5 mg total) by mouth daily.      amLODIPine 10 MG Oral Tab Take 1 tablet (10 mg total) by mouth daily. 90 tablet 3    lisinopril 10 MG Oral Tab Take 1 tablet (10 mg total) by mouth daily. 90 tablet 3    atorvastatin 20 MG Oral Tab Take 1 tablet (20 mg total) by mouth daily. 90 tablet 3    Cholecalciferol (VITAMIN D3) 25 MCG (1000 UT) Oral Cap Take 1 tablet by mouth daily.      Multiple Vitamins-Minerals ([employer] DAILY MULTIPLE FOR MEN) Oral Tab Take by mouth.      empagliflozin (JARDIANCE) 10 MG Oral Tab Take 1  tablet (10 mg total) by mouth daily. (Patient not taking: Reported on 9/11/2024) 90 tablet 3    SITagliptin Phosphate (JANUVIA) 100 MG Oral Tab Take 1 tablet (100 mg total) by mouth daily. (Patient not taking: Reported on 9/11/2024) 90 tablet 3     No Known Allergies   Past Medical History:    Diverticulosis    CT scan 3-17 and colonoscopy 5-17    Dyslipidemia associated with type 2 diabetes mellitus (HCC)    Esophageal reflux    Essential hypertension    Hx of adenomatous colonic polyps    none in 2020. Repeat CLN in 2025    Liver cyst    simple liver cyst on MRI liver    Type 2 diabetes mellitus (HCC)    Vitamin D deficiency     Past Surgical History:   Procedure Laterality Date    Colonoscopy N/A 5/16/2017    Procedure: COLONOSCOPY;  Surgeon: WALTER Parra MD;  Location: [facility] ENDOSCOPY    Colonoscopy N/A 7/1/2020    Procedure: COLONOSCOPY;  Surgeon: WALTER Parra MD;  Location: [facility] ENDOSCOPY      Social History:  Social History     Socioeconomic History    Marital status:    Tobacco Use    Smoking status: Never    Smokeless tobacco: Never   Vaping Use    Vaping status: Never Used   Substance and Sexual Activity    Alcohol use: Yes     Alcohol/week: 1.0 standard drink of alcohol     Types: 1 Glasses of wine per week     Comment: Infrequent wine    Drug use: No    Sexual activity: Not Currently     Partners: Female        EXAM:   GENERAL: Pleasant male appearing well in no distress  /82 (BP Location: Right arm, Patient Position: Sitting, Cuff Size: adult)   Pulse 85   Ht 5' 9\" (1.753 m)   Wt 197 lb 3.2 oz (89.4 kg)   BMI 29.12 kg/m²   Exam deferred      ASSESSMENT AND PLAN:   1. Type 2 diabetes mellitus without complication, without long-term current use of insulin (HCC)  For now, recommend he increase glipizide to 5 mg twice daily with meals.  Prescription sent to pharmacy  Await acceptance into an assistance program to obtain Januvia and Jardiance  Recommend return visit when able for  physical with labs.  Will defer labs for now given lack of medical insurance      The patient indicates understanding of these issues and agrees to the plan.  The patient is asked to return within 3 months for a physical.    Hector Krueger MD  9/11/2024  1:46 PM

## 2024-09-11 NOTE — TELEPHONE ENCOUNTER
Action Requested: Summary for Provider     []  Critical Lab, Recommendations Needed  [] Need Additional Advice  [x]   FYI    []   Need Orders  [] Need Medications Sent to Pharmacy  []  Other     SUMMARY: Patient calling today.   He has lost his job and insurance. He was off all DM medications for about 1.5 weeks. Was out of town and sugars were running 250's so he went to  where he was.   They gave him Glipizide ER 5mg daily to take which he has been taking.   Sugars are still running elevated and he would like to see Dr. Krueger to review meds and see if cheaper alternative medications he can take.   Patient is interviewing for job and has several set in hopes of securing insurance again soon.     No symptoms, Patient says he has been feeling fine.     Patient set for today for visit    Future Appointments   Date Time Provider Department Center   9/11/2024  1:30 PM Hector Krueger MD [facility]     Just to note, patient is working to get meds through program called \"simple fill\" while he has no insurance. Discounted rates for meds. He is filling out paperwork and then they will be contacting MD office - patient will be discussing this further with MD at time of visit today.       Reason for call: Blood Sugar  Onset: last 2 weeks or so       Reason for Disposition   Patient wants to be seen    Protocols used: Diabetes - High Blood Sugar-A-OH

## 2024-09-16 DIAGNOSIS — Z00.00 ANNUAL PHYSICAL EXAM: ICD-10-CM

## 2024-09-20 RX ORDER — LISINOPRIL 10 MG/1
10 TABLET ORAL DAILY
Qty: 90 TABLET | Refills: 3 | Status: SHIPPED | OUTPATIENT
Start: 2024-09-20

## 2024-09-20 NOTE — TELEPHONE ENCOUNTER
Refill passed per [facility] protocol.  Requested Prescriptions   Pending Prescriptions Disp Refills    LISINOPRIL 10 MG Oral Tab [Pharmacy Med Name: LISINOPRIL 10MG TABLETS] 90 tablet 3     Sig: TAKE 1 TABLET(10 MG) BY MOUTH DAILY       Hypertension Medications Protocol Passed - 9/16/2024  9:18 PM        Passed - CMP or BMP in past 12 months        Passed - Last BP reading less than 140/90     BP Readings from Last 1 Encounters:   09/11/24 130/82               Passed - In person appointment or virtual visit in the past 12 mos or appointment in next 3 mos     Recent Outpatient Visits              1 week ago Type 2 diabetes mellitus without complication, without long-term current use of insulin ([facility])    [facility]    Office Visit    5 months ago Type 2 diabetes mellitus without retinopathy ([facility])    [facility]    Office Visit    11 months ago Annual physical exam    [facility]    Office Visit    1 year ago Left shoulder pain, unspecified chronicity    [facility]    Office Visit    1 year ago Left shoulder pain, unspecified chronicity    [facility]    Office Visit                      Passed - EGFRCR or GFRAA > 50     GFR Evaluation  EGFRCR: 73 , resulted on 10/21/2023               Recent Outpatient Visits              1 week ago Type 2 diabetes mellitus without complication, without long-term current use of insulin ([facility])    [facility]    Office Visit    5 months ago Type 2 diabetes mellitus without retinopathy ([facility])    [facility]    Office Visit    11 months ago Annual  physical exam    [facility]    Office Visit    1 year ago Left shoulder pain, unspecified chronicity    [facility] Kev Prui Michael, MD    Office Visit    1 year ago Left shoulder pain, unspecified chronicity    [facility]    Office Visit

## 2024-10-22 ENCOUNTER — TELEPHONE (OUTPATIENT)
Dept: INTERNAL MEDICINE CLINIC | Facility: CLINIC | Age: 54
End: 2024-10-22

## 2024-10-22 DIAGNOSIS — Z00.00 ANNUAL PHYSICAL EXAM: ICD-10-CM

## 2024-10-22 NOTE — TELEPHONE ENCOUNTER
Spoke to patient, verified Name and . Relayed that he has refills on file at the pharmacy for medications Jardiance and Januvia. Patient verbalized understanding. He will follow up with pharmacy. No further questions or concerns at this time.

## 2024-10-22 NOTE — TELEPHONE ENCOUNTER
Mago from [employer] calling to ask if application for assistance for medications Januvia and Jardiance were received, stated faxed on 09/10/24, will refax today, confirmed fax number.

## 2024-10-22 NOTE — TELEPHONE ENCOUNTER
Please call patient, patient is asking for a call back from Dr. Krueger- does not say why he wants a call back from Dr. Krueger-     Patient is asking for refills, should still have some on file at [facility].     Jardiance 10m year supply sent to [facility] on 2024    Januvia 100m year supply sent to [facility] on 2023

## 2024-10-22 NOTE — TELEPHONE ENCOUNTER
Patient calling to request refill for Jardiance and Januvia, stated has not taken in some time as did not have insurance, scheduled appointment for 10/28/24, ellen Lopez in [place], asking for call back from Dr. Krueger.    DC instructions

## 2024-10-24 RX ORDER — ATORVASTATIN CALCIUM 20 MG/1
20 TABLET, FILM COATED ORAL DAILY
Qty: 90 TABLET | Refills: 1 | Status: SHIPPED | OUTPATIENT
Start: 2024-10-24

## 2024-10-24 RX ORDER — LISINOPRIL 10 MG/1
10 TABLET ORAL DAILY
Qty: 90 TABLET | Refills: 1 | Status: SHIPPED | OUTPATIENT
Start: 2024-10-24

## 2024-10-24 RX ORDER — AMLODIPINE BESYLATE 10 MG/1
10 TABLET ORAL DAILY
Qty: 90 TABLET | Refills: 1 | Status: SHIPPED | OUTPATIENT
Start: 2024-10-24

## 2024-10-24 NOTE — TELEPHONE ENCOUNTER
Please review; protocol failed  Medication request is marked high priority: patient states he is completely out of his Januvia and Jardiance.    **[employer] message sent to patient to verify if he would like a short [facility] supply [facility]    Dr. Krueger - please advise patient is requesting his medications go through mail order, so he can get 90 day supply    Future Appointments   Date Time Provider Department Center   10/28/2024 11:30 AM Hector Krueger MD [facility]     Last office visit: 9/11/2024    Requested Prescriptions   Pending Prescriptions Disp Refills    empagliflozin (JARDIANCE) 10 MG Oral Tab 90 tablet 3     Sig: Take 1 tablet (10 mg total) by mouth daily.       Diabetes Medication Protocol Failed - 10/24/2024  3:01 PM        Failed - Last A1C < 7.5 and within past 6 months     Lab Results   Component Value Date    A1C 7.5 (H) 10/21/2023             Failed - EGFRCR or GFRAA > 50     GFR Evaluation            Failed - GFR in the past 12 months        Passed - In person appointment or virtual visit in the past 6 mos or appointment in next 3 mos     Recent Outpatient Visits              1 month ago Type 2 diabetes mellitus without complication, without long-term current use of insulin ([facility]CC)    [facility]    [facility]    6 months ago Type 2 diabetes mellitus without retinopathy (HCC)    [facility]    [facility]    1 year ago Annual physical exam    [facility]    [facility] Visit    1 year ago Left shoulder pain, unspecified chronicity    [facility]    [facility] Visit    1 year ago Left shoulder pain, unspecified chronicity    [facility]    [facility]          Future  Appointments         Provider Department Appt Notes    In 4 days [facility], last px: 10/17/23, bood work, policy informed                    Passed - Microalbumin procedure in past 12 months or taking ACE/ARB          SITagliptin Phosphate (JANUVIA) 100 MG Oral Tab 90 tablet 3     Sig: Take 1 tablet (100 mg total) by mouth daily.       Diabetes Medication Protocol Failed - 10/24/2024  3:01 PM        Failed - Last A1C < 7.5 and within past 6 months     Lab Results   Component Value Date    A1C 7.5 (H) 10/21/2023             Failed - EGFRCR or GFRAA > 50     GFR Evaluation            Failed - GFR in the past 12 months        Passed - In person appointment or virtual visit in the past 6 mos or appointment in next 3 mos     Recent Outpatient Visits              1 month ago Type 2 diabetes mellitus without complication, without long-term current use of insulin ([facility])    [facility]    Office Visit    6 months ago Type 2 diabetes mellitus without retinopathy ([facility])    [facility]    [facility] Visit    1 year ago Annual physical exam    [facility] Maxwell [facility]    Office Visit    1 year ago Left shoulder pain, unspecified chronicity    [facility]    Office Visit    1 year ago Left shoulder pain, unspecified chronicity    [facility]    [facility]          Future Appointments         Provider Department Appt Notes    In 4 days Hector Krueger MD [facility] K[facility] px, last px: 10/17/23, liv lopez, policy informed                    Passed - Microalbumin procedure in past 12 months or taking ACE/ARB          lisinopril 10  MG Oral Tab 90 tablet 3     Sig: Take 1 tablet (10 mg total) by mouth daily.       Hypertension Medications Protocol Failed - 10/24/2024  3:01 PM        Failed - CMP or BMP in past 12 months        Failed - EGFRCR or GFRAA > 50     GFR Evaluation            Passed - Last BP reading less than 140/90     BP Readings from Last 1 Encounters:   09/11/24 130/82               Passed - In person appointment or virtual visit in the past 12 mos or appointment in next 3 mos     Recent Outpatient Visits              1 month ago Type 2 diabetes mellitus without complication, without long-term current use of insulin ([facility])    [facility]    Office Visit    6 months ago Type 2 diabetes mellitus without retinopathy ([facility])    [facility]    [facility]    1 year ago Annual physical exam    [facility]    Office Visit    1 year ago Left shoulder pain, unspecified chronicity    [facility]    Office Visit    1 year ago Left shoulder pain, unspecified chronicity    [facility]    [facility]          Future Appointments         Provider Department Appt Notes    In 4 days [facility], last px: 10/17/23, bood work, policy informed                      atorvastatin 20 MG Oral Tab 90 tablet 3     Sig: Take 1 tablet (20 mg total) by mouth daily.       Cholesterol Medication Protocol Failed - 10/24/2024  3:01 PM        Failed - ALT < 80     Lab Results   Component Value Date    ALT 32 10/21/2023             Failed - ALT resulted within past year        Failed - Lipid panel within past 12 months     Lab Results   Component Value Date    CHOLEST 159 10/21/2023     TRIG 175 (H) 10/21/2023    HDL 36 (L) 10/21/2023    LDL 93 10/21/2023    VLDL 29 10/21/2023    NONHDLC 123 10/21/2023             Passed - In person appointment or virtual visit in the past 12 mos or appointment in next 3 mos     Recent Outpatient Visits              1 month ago Type 2 diabetes mellitus without complication, without long-term current use of insulin ([facility]    [facility]    Office Visit    6 months ago Type 2 diabetes mellitus without retinopathy ([facility])    [facility]    [facility]    1 year ago Annual physical exam    [facility]    Office Visit    1 year ago Left shoulder pain, unspecified chronicity    [facility]    Office Visit    1 year ago Left shoulder pain, unspecified chronicity    [facility]    Office Visit          Future Appointments         Provider Department Appt Notes    In 4 days [facility], last px: 10/17/23, [employer] work, policy informed                      amLODIPine 10 MG Oral Tab 90 tablet 3     Sig: Take 1 tablet (10 mg total) by mouth daily.       Hypertension Medications Protocol Failed - 10/24/2024  3:01 PM        Failed - CMP or BMP in past 12 months        Failed - EGFRCR or GFRAA > 50     GFR Evaluation            Passed - Last BP reading less than 140/90     BP Readings from Last 1 Encounters:   09/11/24 130/82               Passed - In person appointment or virtual visit in the past 12 mos or appointment in next 3 mos     Recent Outpatient Visits              1 month ago Type 2 diabetes mellitus without complication, without long-term current use of insulin ([facility])    [facility]    Office Visit    6 months ago Type 2 diabetes mellitus without retinopathy (HCC)    [facility], Cory Resendiz MD    Office Visit    1 year ago Annual physical exam    [facility]    Office Visit    1 year ago Left shoulder pain, unspecified chronicity    [facility]    Office Visit    1 year ago Left shoulder pain, unspecified chronicity    [facility]    Office Visit          Future Appointments         Provider Department Appt Notes    In 4 days Hector Krueger MD [facility] [place] px, last px: 10/17/23, bood work, policy informed                         Future Appointments         Provider Department Appt Notes    In 4 days Hector Krueger MD [facility] [place] px, last px: 10/17/23, bood work, policy informed          Recent Outpatient Visits              1 month ago Type 2 diabetes mellitus without complication, without long-term current use of insulin (HCC)    [facility]    Office Visit    6 months ago Type 2 diabetes mellitus without retinopathy (HCC)    [facility] Cory Resendiz MD    Office Visit    1 year ago Annual physical exam    [facility]    Office Visit    1 year ago Left shoulder pain, unspecified chronicity    [facility] [place]    Office Visit    1 year ago Left shoulder pain, unspecified chronicity    [facility] [place]    Office Visit

## 2024-10-24 NOTE — TELEPHONE ENCOUNTER
Patient stated that he contacted his insurance and can get a 90 days supply of his medications for cheaper through the mail order pharmacy [facility]. Does not have any Jardiance or Januvia currently. Medications pended.

## 2024-10-26 ENCOUNTER — TELEPHONE (OUTPATIENT)
Dept: INTERNAL MEDICINE CLINIC | Facility: CLINIC | Age: 54
End: 2024-10-26

## 2024-10-26 NOTE — TELEPHONE ENCOUNTER
Patient called requesting to speak to a nurse said its in regards to medication jardiance and januvia, asking since he was taking mounjanro and glipizide if he can start taking jardiance and januvia right away. Please advise

## 2024-10-28 ENCOUNTER — OFFICE VISIT (OUTPATIENT)
Dept: INTERNAL MEDICINE CLINIC | Facility: CLINIC | Age: 54
End: 2024-10-28

## 2024-10-28 VITALS
HEIGHT: 69 IN | HEART RATE: 83 BPM | DIASTOLIC BLOOD PRESSURE: 72 MMHG | SYSTOLIC BLOOD PRESSURE: 130 MMHG | WEIGHT: 198.38 LBS | BODY MASS INDEX: 29.38 KG/M2

## 2024-10-28 DIAGNOSIS — I10 ESSENTIAL HYPERTENSION: ICD-10-CM

## 2024-10-28 DIAGNOSIS — D12.6 ADENOMATOUS POLYP OF COLON, UNSPECIFIED PART OF COLON: ICD-10-CM

## 2024-10-28 DIAGNOSIS — E78.5 DYSLIPIDEMIA ASSOCIATED WITH TYPE 2 DIABETES MELLITUS (HCC): ICD-10-CM

## 2024-10-28 DIAGNOSIS — E11.9 TYPE 2 DIABETES MELLITUS WITHOUT COMPLICATION, WITHOUT LONG-TERM CURRENT USE OF INSULIN (HCC): ICD-10-CM

## 2024-10-28 DIAGNOSIS — Z00.00 ANNUAL PHYSICAL EXAM: Primary | ICD-10-CM

## 2024-10-28 DIAGNOSIS — E11.69 DYSLIPIDEMIA ASSOCIATED WITH TYPE 2 DIABETES MELLITUS (HCC): ICD-10-CM

## 2024-10-28 NOTE — H&P
Ron Roberts is a 53 year old male who presents for a complete physical exam.   HPI:   His last physical was October 2023.  He is feeling well.  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  No particular issues for discussion today.  He was briefly seeing an outside provider and was treated briefly with first Wegovy and then Mounjaro.  He resumed taking Januvia and Jardiance yesterday.    Past medical and past surgical histories reviewed, as outlined below.  Medications reviewed, as listed.  No medication allergies.    Accu-Cheks recently 150-250.  BP checks 120s/70s.  Diet healthy although he has not been regularly exercising.  Weight up 6 pounds since physical October 2023.  Next colonoscopy due July 2025.  Eye exam April 2024 normal without retinopathy.  Tdap vaccine March 2017.  Has received 2 Shingrix vaccines.  Prevnar 20 vaccine October 2022.  Received influenza vaccine this season through work.    Wt Readings from Last 4 Encounters:   10/28/24 198 lb 6.4 oz (90 kg)   09/11/24 197 lb 3.2 oz (89.4 kg)   10/17/23 192 lb 6.4 oz (87.3 kg)   08/05/23 189 lb 3.2 oz (85.8 kg)     Body mass index is 29.3 kg/m².     Current Outpatient Medications   Medication Sig Dispense Refill    empagliflozin (JARDIANCE) 10 MG Oral Tab Take 1 tablet (10 mg total) by mouth daily. 90 tablet 1    SITagliptin Phosphate (JANUVIA) 100 MG Oral Tab Take 1 tablet (100 mg total) by mouth daily. 90 tablet 1    lisinopril 10 MG Oral Tab Take 1 tablet (10 mg total) by mouth daily. 90 tablet 1    atorvastatin 20 MG Oral Tab Take 1 tablet (20 mg total) by mouth daily. 90 tablet 1    amLODIPine 10 MG Oral Tab Take 1 tablet (10 mg total) by mouth daily. 90 tablet 1    Cholecalciferol (VITAMIN D3) 25 MCG (1000 UT) Oral Cap Take 1 tablet by mouth daily.      Multiple Vitamins-Minerals ([facility] DAILY MULTIPLE FOR MEN) Oral Tab Take by mouth.       Allergies[1]   Past Medical History:    Diverticulosis    CT scan 3-17  and colonoscopy 5-17    Dyslipidemia associated with type 2 diabetes mellitus (HCC)    Esophageal reflux    Essential hypertension    Hx of adenomatous colonic polyps    none in 2020. Repeat CLN in 2025    Liver cyst    simple liver cyst on MRI liver    Type 2 diabetes mellitus (HCC)    Vitamin D deficiency      Past Surgical History:   Procedure Laterality Date    Colonoscopy N/A 5/16/2017    Procedure: COLONOSCOPY;  Surgeon: WALTER Parra MD;  Location: [facility] ENDOSCOPY    Colonoscopy N/A 7/1/2020    Procedure: COLONOSCOPY;  Surgeon: WALTER Parra MD;  Location: [facility] ENDOSCOPY      Family History   Problem Relation Age of Onset    Diabetes Mother     Hypertension Mother     Glaucoma Neg     Macular degeneration Neg       Social History:  Social History     Socioeconomic History    Marital status:    Tobacco Use    Smoking status: Never    Smokeless tobacco: Never   Vaping Use    Vaping status: Never Used   Substance and Sexual Activity    Alcohol use: Yes     Alcohol/week: 1.0 standard drink of alcohol     Types: 1 Glasses of wine per week     Comment: Infrequent wine    Drug use: No    Sexual activity: Not Currently     Partners: Female           REVIEW OF SYSTEMS:   GENERAL: No fever  LUNGS: No cough wheezing or shortness of breath  CARDIAC: No lightheadedness palpitations or chest pain  GI: No anorexia heartburn dysphagia nausea vomiting abdominal pain diarrhea constipation or rectal bleeding  : No urinary frequency dysuria or hematuria  MUSCULOSKELETAL: No leg swelling.  No foot ulcerations or lesions  NEURO: No headaches.  No numbness or tingling in either foot    EXAM:   GENERAL: Pleasant male appearing well in no distress  /72   Pulse 83   Ht 5' 9\" (1.753 m)   Wt 198 lb 6.4 oz (90 kg)   BMI 29.30 kg/m²   HEENT: Anicteric, conjunctiva pink, oropharynx normal  NECK: Supple without mass or thyromegaly  NODES: No peripheral adenopathy  LUNGS: Resonant to percussion and clear to  auscultation  CARDIAC: Rhythm regular S1 S2 normal without murmur or edema  ABDOMEN: Bowel sounds normal soft nontender without mass or hepatosplenomegaly  EXTREMITIES: Bilateral barefoot skin diabetic exam is normal, visualized feet and the appearance is normal.  Bilateral monofilament/sensation of both feet is normal.  Pulsation pedal pulse exam of both lower legs/feet is normal as well  PULSES: 2+ bilateral dorsalis pedis and posterior tibial  NEURO: Sensory testing with the 10 g monofilament diabetic sensory exam instrument is normal in both feet    ASSESSMENT AND PLAN:   Ron Roberts is a 53 year old male who presents for a complete physical exam.     1. Annual physical exam  Recommend updated COVID booster at pharmacy  Check CMP CBC glycohemoglobin lipid profile screening PSA TSH with reflex T4 and urine microalbumin when able.  Order sent  Continue current medication  Reinforced healthy diet and encouraged regular exercise  Return visit in 6 months for recheck  - Comp Metabolic Panel (14); Future  - CBC, Platelet; No Differential; Future  - Hemoglobin A1C; Future  - Lipid Panel; Future  - PSA Total, Screen; Future  - TSH W Reflex To Free T4; Future  - Microalb/Creat Ratio, Random Urine; Future    2. Type 2 diabetes mellitus without complication, without long-term current use of insulin (HCC)  Continue current medications and await labs    3. Essential hypertension  Well-controlled.  Continue current medication    4. Dyslipidemia associated with type 2 diabetes mellitus (HCC)  Continue statin and await labs    5.  History of adenomatous polyp of colon, unspecified part of colon  Next colonoscopy due July 2025      Hector Krueger MD  10/28/2024  11:58 AM        [1] No Known Allergies

## 2024-10-28 NOTE — TELEPHONE ENCOUNTER
Verified name and .    Patient was seen during office visit with Dr. Krueger today 10/28/24.    He states that questions was answered during appointment.    He states he will continue the Januvia and Jardiance.

## 2024-10-28 NOTE — PATIENT INSTRUCTIONS
Your blood pressure today was excellent at 130/72 and your exam was normal  Please get an updated COVID booster at your pharmacy  Come in soon when you can for blood and urine testing  Continue current medication and healthy diet, and try to exercise regularly  Return visit in 6 months for a recheck

## 2024-10-31 ENCOUNTER — LAB ENCOUNTER (OUTPATIENT)
Dept: LAB | Facility: HOSPITAL | Age: 54
End: 2024-10-31
Attending: INTERNAL MEDICINE
Payer: COMMERCIAL

## 2024-10-31 DIAGNOSIS — Z00.00 ANNUAL PHYSICAL EXAM: ICD-10-CM

## 2024-10-31 LAB
ALBUMIN SERPL-MCNC: 5 G/DL (ref 3.2–4.8)
ALBUMIN/GLOB SERPL: 1.8 {RATIO} (ref 1–2)
ALP LIVER SERPL-CCNC: 82 U/L
ALT SERPL-CCNC: 41 U/L
ANION GAP SERPL CALC-SCNC: 9 MMOL/L (ref 0–18)
AST SERPL-CCNC: 34 U/L (ref ?–34)
BILIRUB SERPL-MCNC: 0.5 MG/DL (ref 0.3–1.2)
BUN BLD-MCNC: 15 MG/DL (ref 9–23)
BUN/CREAT SERPL: 12.2 (ref 10–20)
CALCIUM BLD-MCNC: 10 MG/DL (ref 8.7–10.4)
CHLORIDE SERPL-SCNC: 107 MMOL/L (ref 98–112)
CHOLEST SERPL-MCNC: 220 MG/DL (ref ?–200)
CO2 SERPL-SCNC: 25 MMOL/L (ref 21–32)
COMPLEXED PSA SERPL-MCNC: 0.47 NG/ML (ref ?–4)
CREAT BLD-MCNC: 1.23 MG/DL
CREAT UR-SCNC: 133.1 MG/DL
DEPRECATED RDW RBC AUTO: 42.7 FL (ref 35.1–46.3)
EGFRCR SERPLBLD CKD-EPI 2021: 70 ML/MIN/1.73M2 (ref 60–?)
ERYTHROCYTE [DISTWIDTH] IN BLOOD BY AUTOMATED COUNT: 14.1 % (ref 11–15)
EST. AVERAGE GLUCOSE BLD GHB EST-MCNC: 192 MG/DL (ref 68–126)
FASTING PATIENT LIPID ANSWER: YES
FASTING STATUS PATIENT QL REPORTED: YES
GLOBULIN PLAS-MCNC: 2.8 G/DL (ref 2–3.5)
GLUCOSE BLD-MCNC: 165 MG/DL (ref 70–99)
HBA1C MFR BLD: 8.3 % (ref ?–5.7)
HCT VFR BLD AUTO: 47.2 %
HDLC SERPL-MCNC: 30 MG/DL (ref 40–59)
HGB BLD-MCNC: 15.9 G/DL
LDLC SERPL CALC-MCNC: 132 MG/DL (ref ?–100)
MCH RBC QN AUTO: 28 PG (ref 26–34)
MCHC RBC AUTO-ENTMCNC: 33.7 G/DL (ref 31–37)
MCV RBC AUTO: 83.2 FL
MICROALBUMIN UR-MCNC: <0.3 MG/DL
NONHDLC SERPL-MCNC: 190 MG/DL (ref ?–130)
OSMOLALITY SERPL CALC.SUM OF ELEC: 297 MOSM/KG (ref 275–295)
PLATELET # BLD AUTO: 304 10(3)UL (ref 150–450)
POTASSIUM SERPL-SCNC: 4.4 MMOL/L (ref 3.5–5.1)
PROT SERPL-MCNC: 7.8 G/DL (ref 5.7–8.2)
RBC # BLD AUTO: 5.67 X10(6)UL
SODIUM SERPL-SCNC: 141 MMOL/L (ref 136–145)
T3FREE SERPL-MCNC: 3 PG/ML (ref 2.4–4.2)
T4 FREE SERPL-MCNC: 1 NG/DL (ref 0.8–1.7)
TRIGL SERPL-MCNC: 322 MG/DL (ref 30–149)
TSI SER-ACNC: 0.51 MIU/ML (ref 0.55–4.78)
VLDLC SERPL CALC-MCNC: 60 MG/DL (ref 0–30)
WBC # BLD AUTO: 5.9 X10(3) UL (ref 4–11)

## 2024-10-31 PROCEDURE — 80061 LIPID PANEL: CPT

## 2024-10-31 PROCEDURE — 36415 COLL VENOUS BLD VENIPUNCTURE: CPT

## 2024-10-31 PROCEDURE — 85027 COMPLETE CBC AUTOMATED: CPT

## 2024-10-31 PROCEDURE — 84439 ASSAY OF FREE THYROXINE: CPT

## 2024-10-31 PROCEDURE — 82570 ASSAY OF URINE CREATININE: CPT

## 2024-10-31 PROCEDURE — 80053 COMPREHEN METABOLIC PANEL: CPT

## 2024-10-31 PROCEDURE — 84481 FREE ASSAY (FT-3): CPT

## 2024-10-31 PROCEDURE — 82043 UR ALBUMIN QUANTITATIVE: CPT

## 2024-10-31 PROCEDURE — 84443 ASSAY THYROID STIM HORMONE: CPT

## 2024-10-31 PROCEDURE — 83036 HEMOGLOBIN GLYCOSYLATED A1C: CPT

## 2024-11-18 NOTE — TELEPHONE ENCOUNTER
Entered into EPIC:Recall colon in 3 years per Dr. Enrique Montero. Last Colon done 5/16/17, next due 5/16/2020. Snapshot updated.     [facility]; keenly responsive

## 2024-11-21 ENCOUNTER — TELEPHONE (OUTPATIENT)
Dept: INTERNAL MEDICINE CLINIC | Facility: CLINIC | Age: 54
End: 2024-11-21

## 2024-11-21 DIAGNOSIS — Z00.00 ANNUAL PHYSICAL EXAM: ICD-10-CM

## 2024-11-21 NOTE — TELEPHONE ENCOUNTER
Provider Information    Authorizing Provider Encounter Provider   Hector Krueger MD Nosek, Michael, MD     Medication Detail    Medication Quantity Refills Start End   SITagliptin Phosphate (JANUVIA) 100 MG Oral Tab 90 tablet 1 10/24/2024 --   Sig:   Take 1 tablet (100 mg total) by mouth daily.     Route:   Oral     Order #:   [id number]       Additional Order Information    Multidose Package Dispensed: No Cost ID: -- Admin Qty: 100 mg    NDC #: -- NDC Qty: --    Calculation: --     Pharmacy Actions and Admin    [facility] Pharmacy Actions     MAR Comment Waste Waste Unit          Outpatient Medication Detail     Disp Refills Start End    SITagliptin Phosphate (JANUVIA) 100 MG Oral Tab 90 tablet 1 10/24/2024 --    Sig - Route: Take 1 tablet (100 mg total) by mouth daily. - Oral    Sent to pharmacy as: SITagliptin Phosphate 100 MG Oral Tablet (Januvia)    E-Prescribing Status: Receipt confirmed by pharmacy (10/24/2024  4:35 PM CDT)    No prior authorization was found for this prescription.    Found prior authorization for another prescription for the same medication: Approved

## 2024-11-21 NOTE — TELEPHONE ENCOUNTER
Approved    Prior authorization approved  Payer: [employer] Case ID: [id number]  Note from payer: Request Reference Number: [id number].  JANUVIA      TAB 100MG is approved through 11/21/2025.  Your patient may now fill this prescription and it will be covered.  Approval Details    Authorization number: [id number]  Authorized from November 21, 2024 to November 21, 2025  Electronic appeal: Not supported  View History

## 2024-11-21 NOTE — TELEPHONE ENCOUNTER
Patient called, verified Name and . States he needs a new prescription for Januvia sent to [facility] as he was told that they only have #70 pills, and he needs #90.     Chart reviewed. Last refilled by John on 10/23 one month supply only. Requesting prescription to be sent to [facility] instead.    [facility] with one refill. Called [facility] and was told that they did not receive this prescription. Medication pended to run through protocol.

## 2024-12-10 ENCOUNTER — OFFICE VISIT (OUTPATIENT)
Dept: DERMATOLOGY CLINIC | Facility: CLINIC | Age: 54
End: 2024-12-10

## 2024-12-10 DIAGNOSIS — D49.2 NEOPLASM OF UNSPECIFIED BEHAVIOR OF BONE, SOFT TISSUE, AND SKIN: ICD-10-CM

## 2024-12-10 DIAGNOSIS — L82.0 SEBORRHEIC KERATOSIS, INFLAMED: ICD-10-CM

## 2024-12-10 DIAGNOSIS — D22.9 MULTIPLE BENIGN NEVI: Primary | ICD-10-CM

## 2024-12-10 PROCEDURE — 99214 OFFICE O/P EST MOD 30 MIN: CPT | Performed by: STUDENT IN AN ORGANIZED HEALTH CARE EDUCATION/TRAINING PROGRAM

## 2024-12-10 PROCEDURE — 11102 TANGNTL BX SKIN SINGLE LES: CPT | Performed by: STUDENT IN AN ORGANIZED HEALTH CARE EDUCATION/TRAINING PROGRAM

## 2024-12-10 PROCEDURE — 11103 TANGNTL BX SKIN EA SEP/ADDL: CPT | Performed by: STUDENT IN AN ORGANIZED HEALTH CARE EDUCATION/TRAINING PROGRAM

## 2024-12-10 PROCEDURE — 88304 TISSUE EXAM BY PATHOLOGIST: CPT | Performed by: STUDENT IN AN ORGANIZED HEALTH CARE EDUCATION/TRAINING PROGRAM

## 2024-12-10 NOTE — PROGRESS NOTES
December 10, 2024    New patient     Referred by:   No referring provider defined for this encounter.     CHIEF COMPLAINT: Lesion     HISTORY OF PRESENT ILLNESS: .    1. Growth   Location: R Shoulder   Duration: Years  Signs and symptoms: None  Current treatment: None  Past treatments: None      DERM HISTORY:  History of skin cancer: No  History of chronic skin disease/condition: No    FAMILY HISTORY:  History of melanoma: No  History of chronic skin disease/condition: No    History/Other:    REVIEW OF SYSTEMS:  Constitutional: Denies fever, chills, unintentional weight loss.   Skin as per HPI    PAST MEDICAL HISTORY:  Past Medical History:    Diverticulosis    CT scan 3-17 and colonoscopy 5-17    Dyslipidemia associated with type 2 diabetes mellitus (HCC)    Esophageal reflux    Essential hypertension    Hx of adenomatous colonic polyps    none in 2020. Repeat CLN in 2025    Liver cyst    simple liver cyst on MRI liver    Type 2 diabetes mellitus (HCC)    Vitamin D deficiency       Medications  Current Outpatient Medications   Medication Sig Dispense Refill    SITagliptin Phosphate (JANUVIA) 100 MG Oral Tab Take 1 tablet (100 mg total) by mouth daily. 90 tablet 1    empagliflozin (JARDIANCE) 10 MG Oral Tab Take 1 tablet (10 mg total) by mouth daily. 90 tablet 1    lisinopril 10 MG Oral Tab Take 1 tablet (10 mg total) by mouth daily. 90 tablet 1    atorvastatin 20 MG Oral Tab Take 1 tablet (20 mg total) by mouth daily. 90 tablet 1    amLODIPine 10 MG Oral Tab Take 1 tablet (10 mg total) by mouth daily. 90 tablet 1    Cholecalciferol (VITAMIN D3) 25 MCG (1000 UT) Oral Cap Take 1 tablet by mouth daily.      Multiple Vitamins-Minerals ([facility] DAILY MULTIPLE FOR MEN) Oral Tab Take by mouth.         Objective:    PHYSICAL EXAM:  General: awake, alert, no acute distress  Skin: Skin exam was performed today including the following: face, back, L thigh. Pertinent findings include:   - L posterior thigh with inflamed  pedunculated papule  - R shoulder with papule  - face with inflamed stuck on papules  - back with regular brown macules    ASSESSMENT & PLAN:  Pathophysiology of diagnoses discussed with patient.  Therapeutic options reviewed. Risks, benefits, and alternatives discussed with patient. Instructions reviewed at length.    #Multiple benign nevi  - Reassured patient of benign nature of these lesions.   - Return for lesions that are new, growing, changing or symptomatic.   - Recommend daily photoprotection with broad-spectrum sunscreen (SPF 30 daily with reapplication every 2 hours), avoidance of sun during peak hours, and sun protective clothing.   - Dermoscopy was used for physical examination of pigmented lesions during today's office visit.    #Neoplasm(s) of uncertain behavior of skin  - Shave biopsy performed today   - Will notify patient with results and arrange for appropriate definitive treatment, if indicated.      Shave of lesion to establish and confirm diagnosis:  Photo taken: Yes    Risks, benefits, alternatives and personnel required for shave biopsy reviewed with patient. Risks discussed include, but not limited to: pain, bleeding, infection, scar, reaction to anesthetic, and recurrence/need for further treatment.  Patient and physician agree as to site(s) to be biopsied. Patient verbalizes understanding and wishes to proceed.     Site(s) prepped with alcohol and anesthetized with 1% lidocaine with epinephrine.   Shave of lesion(s) performed to the level of the dermis. Specimen(s) from A. R shoulder. B. L posterior thigh  sent for pathology to r/o  FEP  50% ALCL and bandaging applied.   Written and verbal wound care instructions provided to patient, understanding verbalized.      #Inflamed seborrheic keratosis- test spot with cautery today  - Cautery, low, blunt tip at 2/4 to lesions on face  Risks (including, but not limited to scarring, pain, bleeding, infection, dyschromia) benefits, alternatives and  personnel discussed with patient who consents to proceed. Vaseline applied after      Return to clinic: as needed or sooner if something concerning arises     Horacio Aleman MD

## 2025-01-20 RX ORDER — ACYCLOVIR 400 MG/1
1 TABLET ORAL
Qty: 3 EACH | Refills: 0 | Status: SHIPPED | OUTPATIENT
Start: 2025-01-20

## 2025-01-20 RX ORDER — ACYCLOVIR 400 MG/1
1 TABLET ORAL
Qty: 3 EACH | Refills: 0 | OUTPATIENT
Start: 2025-01-20

## 2025-01-20 NOTE — TELEPHONE ENCOUNTER
Patient called to request a refill on below medication.    Miroslava on file verified.     Dexcom G7    Patient has scheduled an appointment to establish care with Dr. Owen, previous patient of Dr. Krueger. Patient will like to pickup device today, if possible.

## 2025-01-20 NOTE — TELEPHONE ENCOUNTER
Please review; protocol failed/No Protocol    Patient would like to  today    Patient requesting new continuous glucose sensor    Previous PCP Dr. rKueger    Future Appointments   Date Time Provider Department Center   2025 12:00 PM Frank Owen MD [facility]       Requested Prescriptions   Pending Prescriptions Disp Refills    Continuous Glucose Sensor (DEXCOM G7 SENSOR) Does not apply Misc 3 each 0     Si each Every 10 days. Inset in back of the arm every 10 days       Diabetic Supplies Protocol Failed - 2025  8:55 AM        Failed - Medication is active on med list        Passed - In person appointment or virtual visit in the past 12 mos or appointment in next 3 mos     Recent Outpatient Visits              1 month ago Multiple benign nevi    [facility] Horacio Aleman MD    Office Visit    2 months ago Annual physical exam    [facility]    [facility] Visit    4 months ago Type 2 diabetes mellitus without complication, without long-term current use of insulin [facility]    Office Visit    9 months ago Type 2 diabetes mellitus without retinopathy (HCC)    [facility]    Office Visit    1 year ago Annual physical exam    [facility]    [facility] Visit          Future Appointments         Provider Department Appt Notes    In 3 days [facility]                       Future Appointments         Provider Department Appt Notes    In 3 days [facility]          Recent Outpatient Visits              1 month ago Multiple benign  radhika    [facility]    Office Visit    2 months ago Annual physical exam    [facility]    Office Visit    4 months ago Type 2 diabetes mellitus without complication, without long-term current use of insulin ([facility])    [facility] Hector Maxwell MD    Office Visit    9 months ago Type 2 diabetes mellitus without retinopathy ([facility]    [facility]    1 year ago Annual physical exam    [facility] Hector Maxwell [facility]    Office Visit

## 2025-02-13 RX ORDER — HYDROCHLOROTHIAZIDE 12.5 MG/1
1 CAPSULE ORAL AS DIRECTED
Qty: 3 EACH | Refills: 1 | Status: SHIPPED | OUTPATIENT
Start: 2025-02-13

## 2025-02-13 RX ORDER — ACYCLOVIR 400 MG/1
1 TABLET ORAL AS DIRECTED
Qty: 3 EACH | Refills: 1 | Status: SHIPPED | OUTPATIENT
Start: 2025-02-13

## 2025-02-13 NOTE — TELEPHONE ENCOUNTER
Dexcom G7 Sensors: 3 month supply sent 1/20/2025.   * Patient only filled a 1 sensor, which lasted only 10 days.   * Will pend for a new prescription, as patient only has 2 sensors remaining, and is not a full 30 days and will not last patient until upcoming appointment.    Will pend a new prescription for the Freestyle 3 PLUS Sensors, per patient's request.

## 2025-02-13 NOTE — TELEPHONE ENCOUNTER
Patient is requesting refill to last until appointment that is scheduled on 4/17/25.   Patient is completely out.     Current Outpatient Medications   Medication Sig Dispense Refill    Continuous Glucose Sensor (DEXCOM G7 SENSOR) Does not apply Misc 1 each Every 10 days. Use as directed every 10 days 3 each 0     Patient is also asking for a prescription of Freestyle filomena 3 plus - to see which one is cheaper.         [id number] - [place], [place] - [address] AT [facility] [[email or web address]], [phone], [phone]

## 2025-02-13 NOTE — TELEPHONE ENCOUNTER
Please kindly review, medication fails/has no protocol attached.   Medication request is marked high priority: patient states they are out and will not have enough until April appointment.    [x] FAILS    [] NO PROTOCOL ATTACHED    Dr. Owen, please advise -   - Previous patient of Dr. Krueger   - Patient requested a refill for Dexcom sensors on 25 and was sent to Dr. Jarrell.   - Dr. Jarrell advised to send to you, as patient had an appointment 25.   - Patient was provided a 3 month supply of the Dexcom sensors.   - Patient canceled his 25 appointment and is rescheduled for 25.    **Patient is requesting a new prescription for the Dexcom sensors, as only 1 sensor was dispensed and would like enough to get him to his upcoming appointment.    **Patient is also requesting a prescription for Freestyle Liset 3 PLUS sensors, to see if these will be cheaper than the Dexcom Sensors.    Please advise if refill is appropriate and if agreeable to sending prescription for the Freestyle Liset sensors. Thank you.    Future Appointments   Date Time Provider Department Center   2025 12:30 PM Frank [facility] MD [facility]     Last office visit: 10/28/2024    Requested Prescriptions   Pending Prescriptions Disp Refills    Continuous Glucose Sensor (FREESTYLE LISET 3 PLUS SENSOR) Does not apply Misc 3 each 1     Si each As Directed. INSERT 1 SENSOR ON THE BACK OF THE ARM AND CHANGE EVERY 15 DAYS.       Diabetic Supplies Protocol Failed - 2025  9:21 AM        Failed - Medication is active on med list        Passed - In person appointment or virtual visit in the past 12 mos or appointment in next 3 mos     Recent Outpatient Visits              2 months ago [facility] benign nevi    [facility]    [facility] Visit    3 months ago Annual physical exam    [facility]    Office  Visit    5 months ago Type 2 diabetes mellitus without complication, without long-term current use of insulin ([facility])    [facility]    Office Visit    10 months ago Type 2 diabetes mellitus without retinopathy ([facility])    [facility]    Office Visit    1 year ago Annual physical exam    [facility] [place] [facility]    Office Visit          Future Appointments         Provider Department Appt Notes    In 2 months Frank [facility] MD [facility] - est care with Dr GRACE Owen  trans from Dr Krueger                      Continuous Glucose Sensor (DEXCOM G7 SENSOR) Does not apply Misc 3 each 1     Si each As Directed. INSERT 1 SENSOR AT THE BACK OF THE ARM AND CHANGE EVERY 10 DAYS.       Diabetic Supplies Protocol Failed - 2025  9:21 AM        Failed - Medication is active on med list        Passed - In person appointment or virtual visit in the past 12 mos or appointment in next 3 mos     Recent Outpatient Visits              2 months ago Multiple benign nevi    [email or web address]    Office Visit    3 months ago Annual physical exam    [facility]    Office Visit    5 months ago Type 2 diabetes mellitus without complication, without long-term current use of insulin ([facility])    [facility] [place]    Office Visit    10 months ago Type 2 diabetes mellitus without retinopathy ([facility])    [facility]    Office Visit    1 year ago Annual physical exam    [facility]    Office Visit          Future Appointments          Provider Department Appt Notes    In 2 months Frank Owen MD [facility] NP - est care with Dr GRACE Owen  trans from Dr Krueger

## 2025-04-01 ENCOUNTER — TELEPHONE (OUTPATIENT)
Facility: CLINIC | Age: 55
End: 2025-04-01

## 2025-04-01 NOTE — TELEPHONE ENCOUNTER
Patient outreach message received:    Recall colon in 5 years per Dr Parra, due 7/1/2025. Recall entered in epic and health maintenance updated.     Recall reminder letter sent out to patient via [employer].

## 2025-04-10 ENCOUNTER — TELEPHONE (OUTPATIENT)
Dept: INTERNAL MEDICINE CLINIC | Facility: CLINIC | Age: 55
End: 2025-04-10

## 2025-06-02 DIAGNOSIS — Z00.00 ANNUAL PHYSICAL EXAM: ICD-10-CM

## 2025-06-02 NOTE — TELEPHONE ENCOUNTER
Current Outpatient Medications:     empagliflozin (JARDIANCE) 10 MG Oral Tab, Take 1 tablet (10 mg total) by mouth daily., Disp: 90 tablet, Rfl: 1

## 2025-06-03 NOTE — TELEPHONE ENCOUNTER
Please review. Refill failed protocol.     Sending to covering provider as Dr. Krueger is no longer with practice.

## 2025-06-04 NOTE — TELEPHONE ENCOUNTER
1st attempt/MyChart message was sent to the patient to schedule an appointment. Please, see message below.

## 2025-06-06 NOTE — TELEPHONE ENCOUNTER
Spoke, with the patient and informed him of the message below. Patient stated that he will call back to schedule an appointment.

## (undated) DIAGNOSIS — Z00.00 ANNUAL PHYSICAL EXAM: ICD-10-CM

## (undated) DEVICE — MEDI-VAC NON-CONDUCTIVE SUCTION TUBING 6MM X 1.8M (6FT.) L: Brand: CARDINAL HEALTH

## (undated) DEVICE — ENDOSCOPY PACK - LOWER: Brand: MEDLINE INDUSTRIES, INC.

## (undated) DEVICE — Device: Brand: CUSTOM PROCEDURE KIT

## (undated) DEVICE — MASK PROC W/VISOR ANTIGLARE

## (undated) DEVICE — Device: Brand: DEFENDO AIR/WATER/SUCTION AND BIOPSY VALVE

## (undated) DEVICE — 35 ML SYRINGE REGULAR TIP: Brand: MONOJECT

## (undated) DEVICE — LINE MNTR ADLT SET O2 INTMD

## (undated) DEVICE — SNARE CAPTIFLEX MICRO-OVL OLY

## (undated) DEVICE — SPECIMEN TRAP LUKI

## (undated) NOTE — ED AVS SNAPSHOT
[facility]    [address]     [place] [address]    Phone:  [phone]    Fax:  [phone]           Eric Radha   MRN: [id number]    Department:  [facility]   Date of Visit:  5 aspect of your visit today. In an effort to constantly improve our service to you, we would appreciate any positive or negative feedback related to the care you received in our emergency department. Please call our [address] [facility] [facility] at [phone].   Your Lena contact you. Please make sure we have your correct phone number on file.       I certified that I have received a copy of the aftercare instructions; that these instructions have been explained to me; all questions pertaining to these instructions have bee visit,  view other health information, and more. To sign up or find more information, go to [email or web address] and click on the Sign Up Now link in the [employer] box.      Enter your [email or web address] Activation Code exactly as it appears below along with yo

## (undated) NOTE — ED AVS SNAPSHOT
[facility]    [address]     [place] [address]    Phone:  [phone]    Fax:  [phone]           Melisa Rust   MRN: [id number]    Department:  [facility]   Date of Visit:  3 and Class Registration line at [phone] or find a doctor online by visiting [email or web address].    IF THERE IS ANY CHANGE OR WORSENING OF YOUR CONDITION, CALL YOUR PRIMARY CARE PHYSICIAN AT ONCE OR RETURN IMMEDIATELY TO [address] [place] [address].     If

## (undated) NOTE — ED AVS SNAPSHOT
[facility] Emergency Department    [address]     [place] [address]    Phone:  [phone]    Fax:  [phone]           Sarath Elam   MRN: [id number]    Department:  [facility] Emergency Department   Date of Visit:  3 Take 1-2 tablets ( mg total) by mouth every 4 (four) hours as needed for Pain.             Where to Get Your Medications      You can get these medications from any pharmacy     Bring a paper prescription for each of these medications    - levofloxaci primary care or specialist physician may see patients referred from the [facility] Emergency Department. Follow-up care is at the discretion of that Physician.   If you need additional assistance selecting a physician, you may call the Jyoti Lennon [facility] [address] ([facility] 43) [address] [facility] [address] [address]  [place] [address]) [phone]                Additional Information       We are concerned for your overall well being:    - If you are a smoker or

## (undated) NOTE — IP AVS SNAPSHOT
[facility]    [address], [facility], [facility] ~ [phone]                Discharge Summary   5/16/2017    Kayode Alvarez           Admission Information        Provider Department    5/16/2017 Vilma Fontaine MD [facility] DIVERTICULOSIS AND DIVERTICULITIS, UNDERSTANDING (ENGLISH)    COLON AND RECTAL POLYPS, UNDERSTANDING (ENGLISH)      Instructions and Information about Your Health     None      Immunization History as of 5/16/2017  Never Reviewed    INFLUENZA 2/26/2017 harming yourself, contact [facility] at [phone]. - If you don’t have insurance, Tanya Muniz has partnered with [facility] to help you get signed up for insurance coverage.   Patient [place]

## (undated) NOTE — LETTER
4/3/2019              lEvin Roberts        [address]         [address] [facility] [address]         Dear Harjinder Cm,    Your recent heart scan was normal with a calcium score of 0, indicating a very low risk of significant heart disease.     Si

## (undated) NOTE — MR AVS SNAPSHOT
[facility]  [address] [place] [address] [phone]               Thank you for choosing us for your health care visit with Yuan Archer MD.  We are glad to serve you and happy to provide you with this summary of your visit. lisinopril 10 MG Tabs   Take 10 mg by mouth daily.    Commonly known as:  PRINIVIL,ZESTRIL           MetFORMIN HCl 500 MG Tabs   TK 1/2 T PO QD   Commonly known as:  GLUCOPHAGE           metRONIDAZOLE 500 MG Tabs   Take 1 tablet (500 mg total) by mouth 3 ( medical emergencies, dial 911. Educational Information     Healthy Diet and Regular Exercise  [employer] for making healthy food choices  -   Enjoy your food, but eat less. Fully enjoy your food when eating.    Don’t eat w

## (undated) NOTE — ED AVS SNAPSHOT
Shelby Odonnell   MRN: [id number]    Department:  [facility] Emergency Department   Date of Visit:  2/4/2020           Disclosure     Insurance plans vary and the physician(s) referred by the ER may not be covered by your plan.  Please CARE PHYSICIAN AT ONCE OR RETURN IMMEDIATELY TO THE EMERGENCY DEPARTMENT. If you have been prescribed any medication(s), please fill your prescription right away and begin taking the medication(s) as directed.   If you believe that any of the medications

## (undated) NOTE — LETTER
5/12/2025    Ron Roberts        [address]         [facility] [address]            Dear Ron Roberts,      Our records indicate that you are due for an appointment for a Colonoscopy with ESTHER Parra MD. Our doctors are booking out about 3-6 months in advance for procedures.     Please call our office to schedule this appointment.  Your medical well-being is important to us.    If your insurance requires a referral, please call your primary care office to request one.      Thank you,      The Physicians and Staff at [facility]

## (undated) NOTE — ED AVS SNAPSHOT
[facility] Emergency Department    [address]     [place] [address]    Phone:  [phone]    Fax:  [phone]           Leonie Lucanoemi   MRN: [id number]    Department:  [facility] Emergency Department   Date of Visit:  5 and Class Registration line at [phone] or find a doctor online by visiting [email or web address].    IF THERE IS ANY CHANGE OR WORSENING OF YOUR CONDITION, CALL YOUR PRIMARY CARE PHYSICIAN AT ONCE OR RETURN IMMEDIATELY TO [address] [place] [address].     If

## (undated) NOTE — LETTER
Ron Santos [facility]     [address]   [facility] [address]    Hello,      This is the [facility], office of Dr. Sadia Jarrell,     Thank you for putting your trust in [facility].  Our goal is to deliver the highest quality healthcare and an exceptional patient experience. Upon reviewing of your medical record shows you are due for the following:     Colonoscopy Screening  Diabetes Dilated Eye Exam  Diabetes Care A1C     Please call [phone] to schedule your appointment or schedule online via [employer] ([employer]).     If you changed to a new provider at another facility, please notify the clinic to update your records.     If you had any recent testing at another facility, please have your results faxed to our office at [phone].      Thank you and have a great day!

## (undated) NOTE — LETTER
4/1/2025    Ron Roberts        [address]         [facility] [address]            Dear Ron Roberts,      Our records indicate that you are due for an appointment for a Colonoscopy with ESTHER Parra MD. Our doctors are booking out about 3-6 months in advance for procedures.     Please call our office to schedule this appointment.  Your medical well-being is important to us.    If your insurance requires a referral, please call your primary care office to request one.      Thank you,      The Physicians and Staff at [facility]

## (undated) NOTE — LETTER
April 13, 2024      No Recipients     Patient: Ron Roberts   YOB: 1970   Date of Visit: 4/13/2024       Dear Dr. Rodriguez Recipients:    Thank you for referring Ron Roberts to me for evaluation. Here is my assessment and plan of care:    Ron Roberts is a 53 year old male.    HPI:     HPI    Pt is here for diabetic eye exam.  Pt denies vision changes.    Pt has been a diabetic for 20 years       Pt's diabetes is currently controlled by pills    Pt checks blood sugar rarely  Pt's last blood sugar was  124 on 10/21/23  Last HA1C was 7.5 on 10/21/23  Endocrinologist: none        Last edited by Adeline Quinn, O.T. on 4/13/2024  9:52 AM.        Patient History:  Past Medical History:    Diverticulosis    CT scan 3-17 and colonoscopy 5-17    Dyslipidemia associated with type 2 diabetes mellitus (HCC)    Esophageal reflux    Essential hypertension    Hx of adenomatous colonic polyps    none in 2020. Repeat CLN in 2025    Liver cyst    simple liver cyst on MRI liver    Type 2 diabetes mellitus (HCC)    Vitamin D deficiency       Surgical History: Ron Roberts has a past surgical history that includes colonoscopy (N/A, 5/16/2017) (Procedure: COLONOSCOPY;  Surgeon: WALTER Parra MD;  Location: [facility] ENDOSCOPY) and colonoscopy (N/A, 7/1/2020) (Procedure: COLONOSCOPY;  Surgeon: WALTER Parra MD;  Location: [facility] ENDOSCOPY).    Family History   Problem Relation Age of Onset    Diabetes Mother     Hypertension Mother     Glaucoma Neg     Macular degeneration Neg        Social History:   Social History     Socioeconomic History    Marital status:    Tobacco Use    Smoking status: Never    Smokeless tobacco: Never   Vaping Use    Vaping status: Never Used   Substance and Sexual Activity    Alcohol use: Yes     Alcohol/week: 1.0 standard drink of alcohol     Types: 1 Glasses of wine per week     Comment: Infrequent wine    Drug use: No    Sexual activity: Not  Currently     Partners: Female       Medications:  Current Outpatient Medications   Medication Sig Dispense Refill    empagliflozin (JARDIANCE) 10 MG Oral Tab Take 1 tablet (10 mg total) by mouth daily. 90 tablet 3    SITagliptin Phosphate (JANUVIA) 100 MG Oral Tab Take 1 tablet (100 mg total) by mouth daily. 90 tablet 3    amLODIPine 10 MG Oral Tab Take 1 tablet (10 mg total) by mouth daily. 90 tablet 3    lisinopril 10 MG Oral Tab Take 1 tablet (10 mg total) by mouth daily. 90 tablet 3    atorvastatin 20 MG Oral Tab Take 1 tablet (20 mg total) by mouth daily. 90 tablet 3    Cholecalciferol (VITAMIN D3) 25 MCG (1000 UT) Oral Cap Take 1 tablet by mouth daily.      Multiple Vitamins-Minerals ([employer] DAILY MULTIPLE FOR MEN) Oral Tab Take by mouth.         Allergies:  No Known Allergies    ROS:     ROS    Positive for: Endocrine, Eyes  Negative for: Constitutional, Gastrointestinal, Neurological, Skin, Genitourinary, Musculoskeletal, HENT, Cardiovascular, Respiratory, Psychiatric, Allergic/Imm, Heme/Lymph  Last edited by Adeline Quinn, O.T. on 4/13/2024  9:52 AM.          PHYSICAL EXAM:     Base Eye Exam       Visual Acuity (Snellen - Linear)         Right Left    Dist cc 20/20 -1 20/20    Near cc 20/20 20/20      Correction: Glasses              Tonometry (Icare, 9:59 AM)         Right Left    Pressure 18 18              Pupils         Pupils    Right PERRL    Left PERRL              Visual Fields         Left Right     Full Full              Extraocular Movement         Right Left     Full, Ortho Full, Ortho              Neuro/Psych       Oriented x3: Yes    Mood/Affect: Normal              Dilation       Both eyes: 1.0% Mydriacyl and 2.5% Sushil Synephrine @ 9:59 AM              Dilation #2       Both eyes: 1.0% Mydriacyl and 2.5% Sushil Synephrine @ 10:00 AM                  Slit Lamp and Fundus Exam       External Exam         Right Left    External Normal Normal              Slit Lamp Exam         Right Left     Lids/Lashes Meibomian gland dysfunction Meibomian gland dysfunction    Conjunctiva/Sclera Nasal pinguecula, Ocular Melanosis Nasal/temp pinguecula, Ocular Melanosis    Cornea trace Arcus trace Arcus    Anterior Chamber Deep and quiet Deep and quiet    Iris Normal Normal    Lens 1+ Nuclear sclerosis, Trace Cortical cataract 1+ Nuclear sclerosis    Vitreous Clear Clear              Fundus Exam         Right Left    Disc Good rim Good rim    C/D Ratio 0.5 0.5    Macula Normal-no BDR Normal-no BDR    Vessels Normal Normal    Periphery Normal Normal                  Refraction       Wearing Rx         Sphere Cylinder Axis Add    Right -1.50 +1.00 180 +1.50    Left -1.50 +1.25 005 +1.50      Age: 1yr    Type: Progressive bifocal              Manifest Refraction    Declined refraction, happy with current prescription.                    ASSESSMENT/PLAN:     Diagnoses and Plan:     Type 2 diabetes mellitus without retinopathy (HCC)  Diabetes type II: no background of retinopathy, no signs of neovascularization noted.  Discussed ocular and systemic benefits of blood sugar control.  Diagnosis and treatment discussed in detail with patient.    Will see patient in 1 year for a diabetic exam    Age-related nuclear cataract of both eyes  Discussed early cataracts with patient.  No treatment recommended at this time.     No orders of the defined types were placed in this encounter.      Meds This Visit:  Requested Prescriptions      No prescriptions requested or ordered in this encounter        Follow up instructions:  Return in about 1 year (around 4/13/2025) for Diabetic eye exam.    4/13/2024  Scribed by: Cory Vargas MD        If you have questions, please do not hesitate to call me. I look forward to following Ron along with you.    Sincerely,        Cory Vargas MD        CC:   No Recipients    Document electronically generated by: Cory Vargas MD

## (undated) NOTE — LETTER
Patient Name: Cecil Henning  YOB: 1970          MRN :  [id number]  Date:  9/8/2020  Referring Physician:  Royer Ellis  Pt has attended 8 visits in Physical Therapy.      Dx: Right thigh pain (M79.651) 5. Pt to exhibit increase in lumbar AROM to [facility] in all planes for ease of mobility/transfers in order for patient to get into/out of car-MET      Plan: Discharge from PT    Patient was advised of these findings, precautions, and treatment options and has a

## (undated) NOTE — LETTER
[facility], [address] [address]  [phone]                05/18/2017      Robyn Zee  [address] [place] [address]        Dear [facility] Cooper,    I reviewed the pathology

## (undated) NOTE — LETTER
[facility]     [address]   [facility] [address]    Hello,      This is the [facility], office of Dr. Frank Owen,     Thank you for putting your trust in [facility].  Our goal is to deliver the highest quality healthcare and an exceptional patient experience. Upon reviewing of your medical record shows you are due for the following:     Colonoscopy Screening  Diabetes Care Dilated Eye Exam  Diabetes Care a A1C  Diabetes Care Foot Exam    Please call [phone] to schedule your appointment or schedule online via [employer].     If you changed to a new provider at another facility, please notify the clinic to update your records.     If you had any recent testing at another facility, please have your results faxed to our office at [phone].      Thank you and have a great day!